# Patient Record
Sex: FEMALE | Race: WHITE | HISPANIC OR LATINO | Employment: UNEMPLOYED | URBAN - METROPOLITAN AREA
[De-identification: names, ages, dates, MRNs, and addresses within clinical notes are randomized per-mention and may not be internally consistent; named-entity substitution may affect disease eponyms.]

---

## 2017-04-04 ENCOUNTER — HOSPITAL ENCOUNTER (OUTPATIENT)
Dept: RADIOLOGY | Facility: MEDICAL CENTER | Age: 57
End: 2017-04-04
Attending: INTERNAL MEDICINE
Payer: COMMERCIAL

## 2017-04-04 DIAGNOSIS — D50.9 IRON DEFICIENCY ANEMIA, UNSPECIFIED: ICD-10-CM

## 2017-04-04 PROCEDURE — 71260 CT THORAX DX C+: CPT

## 2017-04-04 PROCEDURE — 700117 HCHG RX CONTRAST REV CODE 255: Performed by: INTERNAL MEDICINE

## 2017-04-04 RX ADMIN — IOHEXOL 100 ML: 350 INJECTION, SOLUTION INTRAVENOUS at 10:49

## 2017-07-05 ENCOUNTER — HOSPITAL ENCOUNTER (OUTPATIENT)
Dept: RADIOLOGY | Facility: MEDICAL CENTER | Age: 57
End: 2017-07-05
Attending: INTERNAL MEDICINE
Payer: COMMERCIAL

## 2017-07-05 DIAGNOSIS — D50.9 IRON DEFICIENCY ANEMIA, UNSPECIFIED: ICD-10-CM

## 2017-07-05 DIAGNOSIS — C77.2 CANCER OF SIGMOID COLON METASTATIC TO INTRA-ABDOMINAL LYMPH NODE (HCC): ICD-10-CM

## 2017-07-05 DIAGNOSIS — C18.7 CANCER OF SIGMOID COLON METASTATIC TO INTRA-ABDOMINAL LYMPH NODE (HCC): ICD-10-CM

## 2017-07-05 PROCEDURE — 700117 HCHG RX CONTRAST REV CODE 255: Performed by: INTERNAL MEDICINE

## 2017-07-05 PROCEDURE — 71260 CT THORAX DX C+: CPT

## 2017-07-05 RX ADMIN — IOHEXOL 75 ML: 350 INJECTION, SOLUTION INTRAVENOUS at 09:17

## 2018-08-02 ENCOUNTER — APPOINTMENT (OUTPATIENT)
Dept: RADIOLOGY | Facility: MEDICAL CENTER | Age: 58
End: 2018-08-02
Attending: INTERNAL MEDICINE
Payer: COMMERCIAL

## 2018-08-13 ENCOUNTER — HOSPITAL ENCOUNTER (OUTPATIENT)
Dept: RADIOLOGY | Facility: MEDICAL CENTER | Age: 58
End: 2018-08-13
Attending: INTERNAL MEDICINE
Payer: COMMERCIAL

## 2018-08-13 DIAGNOSIS — C18.7 MALIGNANT NEOPLASM OF SIGMOID COLON (HCC): ICD-10-CM

## 2018-08-13 DIAGNOSIS — D50.9 NORMOCYTIC HYPOCHROMIC ANEMIA: ICD-10-CM

## 2018-08-13 PROCEDURE — 700117 HCHG RX CONTRAST REV CODE 255: Performed by: INTERNAL MEDICINE

## 2018-08-13 PROCEDURE — 71260 CT THORAX DX C+: CPT

## 2018-08-13 RX ADMIN — IOHEXOL 100 ML: 350 INJECTION, SOLUTION INTRAVENOUS at 14:00

## 2018-08-13 RX ADMIN — IOHEXOL 50 ML: 240 INJECTION, SOLUTION INTRATHECAL; INTRAVASCULAR; INTRAVENOUS; ORAL at 13:38

## 2018-09-07 ENCOUNTER — HOSPITAL ENCOUNTER (EMERGENCY)
Facility: MEDICAL CENTER | Age: 58
End: 2018-09-07
Attending: EMERGENCY MEDICINE
Payer: COMMERCIAL

## 2018-09-07 ENCOUNTER — APPOINTMENT (OUTPATIENT)
Dept: RADIOLOGY | Facility: MEDICAL CENTER | Age: 58
End: 2018-09-07
Attending: EMERGENCY MEDICINE
Payer: COMMERCIAL

## 2018-09-07 VITALS
RESPIRATION RATE: 18 BRPM | TEMPERATURE: 97.7 F | SYSTOLIC BLOOD PRESSURE: 132 MMHG | HEART RATE: 83 BPM | DIASTOLIC BLOOD PRESSURE: 64 MMHG | OXYGEN SATURATION: 94 % | HEIGHT: 59 IN | WEIGHT: 156.31 LBS | BODY MASS INDEX: 31.51 KG/M2

## 2018-09-07 DIAGNOSIS — R10.13 EPIGASTRIC PAIN: ICD-10-CM

## 2018-09-07 DIAGNOSIS — A09 DIARRHEA OF INFECTIOUS ORIGIN: ICD-10-CM

## 2018-09-07 LAB
ALBUMIN SERPL BCP-MCNC: 4.2 G/DL (ref 3.2–4.9)
ALBUMIN/GLOB SERPL: 1.4 G/DL
ALP SERPL-CCNC: 72 U/L (ref 30–99)
ALT SERPL-CCNC: 33 U/L (ref 2–50)
ANION GAP SERPL CALC-SCNC: 7 MMOL/L (ref 0–11.9)
APPEARANCE UR: CLEAR
AST SERPL-CCNC: 27 U/L (ref 12–45)
BASOPHILS # BLD AUTO: 0.3 % (ref 0–1.8)
BASOPHILS # BLD: 0.02 K/UL (ref 0–0.12)
BILIRUB SERPL-MCNC: 0.4 MG/DL (ref 0.1–1.5)
BILIRUB UR QL STRIP.AUTO: NEGATIVE
BUN SERPL-MCNC: 17 MG/DL (ref 8–22)
CALCIUM SERPL-MCNC: 9.3 MG/DL (ref 8.4–10.2)
CHLORIDE SERPL-SCNC: 107 MMOL/L (ref 96–112)
CO2 SERPL-SCNC: 25 MMOL/L (ref 20–33)
COLOR UR: YELLOW
CREAT SERPL-MCNC: 0.78 MG/DL (ref 0.5–1.4)
EOSINOPHIL # BLD AUTO: 0.19 K/UL (ref 0–0.51)
EOSINOPHIL NFR BLD: 2.4 % (ref 0–6.9)
EPI CELLS #/AREA URNS HPF: ABNORMAL /HPF
ERYTHROCYTE [DISTWIDTH] IN BLOOD BY AUTOMATED COUNT: 40.9 FL (ref 35.9–50)
GLOBULIN SER CALC-MCNC: 3.1 G/DL (ref 1.9–3.5)
GLUCOSE SERPL-MCNC: 105 MG/DL (ref 65–99)
GLUCOSE UR STRIP.AUTO-MCNC: NEGATIVE MG/DL
HCG SERPL QL: NEGATIVE
HCT VFR BLD AUTO: 45 % (ref 37–47)
HGB BLD-MCNC: 15.4 G/DL (ref 12–16)
IMM GRANULOCYTES # BLD AUTO: 0.02 K/UL (ref 0–0.11)
IMM GRANULOCYTES NFR BLD AUTO: 0.3 % (ref 0–0.9)
KETONES UR STRIP.AUTO-MCNC: NEGATIVE MG/DL
LEUKOCYTE ESTERASE UR QL STRIP.AUTO: NEGATIVE
LIPASE SERPL-CCNC: 28 U/L (ref 7–58)
LYMPHOCYTES # BLD AUTO: 3.45 K/UL (ref 1–4.8)
LYMPHOCYTES NFR BLD: 43.7 % (ref 22–41)
MCH RBC QN AUTO: 30 PG (ref 27–33)
MCHC RBC AUTO-ENTMCNC: 34.2 G/DL (ref 33.6–35)
MCV RBC AUTO: 87.7 FL (ref 81.4–97.8)
MICRO URNS: ABNORMAL
MONOCYTES # BLD AUTO: 0.64 K/UL (ref 0–0.85)
MONOCYTES NFR BLD AUTO: 8.1 % (ref 0–13.4)
MUCOUS THREADS #/AREA URNS HPF: ABNORMAL /HPF
NEUTROPHILS # BLD AUTO: 3.58 K/UL (ref 2–7.15)
NEUTROPHILS NFR BLD: 45.2 % (ref 44–72)
NITRITE UR QL STRIP.AUTO: NEGATIVE
NRBC # BLD AUTO: 0 K/UL
NRBC BLD-RTO: 0 /100 WBC
PH UR STRIP.AUTO: 7 [PH]
PLATELET # BLD AUTO: 169 K/UL (ref 164–446)
PMV BLD AUTO: 11.3 FL (ref 9–12.9)
POTASSIUM SERPL-SCNC: 3.3 MMOL/L (ref 3.6–5.5)
PROT SERPL-MCNC: 7.3 G/DL (ref 6–8.2)
PROT UR QL STRIP: NEGATIVE MG/DL
RBC # BLD AUTO: 5.13 M/UL (ref 4.2–5.4)
RBC # URNS HPF: ABNORMAL /HPF
RBC UR QL AUTO: ABNORMAL
SODIUM SERPL-SCNC: 139 MMOL/L (ref 135–145)
SP GR UR STRIP.AUTO: <=1.005
WBC # BLD AUTO: 7.9 K/UL (ref 4.8–10.8)

## 2018-09-07 PROCEDURE — 84703 CHORIONIC GONADOTROPIN ASSAY: CPT

## 2018-09-07 PROCEDURE — 36415 COLL VENOUS BLD VENIPUNCTURE: CPT

## 2018-09-07 PROCEDURE — 99285 EMERGENCY DEPT VISIT HI MDM: CPT

## 2018-09-07 PROCEDURE — 74177 CT ABD & PELVIS W/CONTRAST: CPT

## 2018-09-07 PROCEDURE — A9270 NON-COVERED ITEM OR SERVICE: HCPCS | Performed by: EMERGENCY MEDICINE

## 2018-09-07 PROCEDURE — 700117 HCHG RX CONTRAST REV CODE 255: Performed by: EMERGENCY MEDICINE

## 2018-09-07 PROCEDURE — 80053 COMPREHEN METABOLIC PANEL: CPT

## 2018-09-07 PROCEDURE — 83690 ASSAY OF LIPASE: CPT

## 2018-09-07 PROCEDURE — 85025 COMPLETE CBC W/AUTO DIFF WBC: CPT

## 2018-09-07 PROCEDURE — 96374 THER/PROPH/DIAG INJ IV PUSH: CPT

## 2018-09-07 PROCEDURE — 81001 URINALYSIS AUTO W/SCOPE: CPT

## 2018-09-07 PROCEDURE — 700102 HCHG RX REV CODE 250 W/ 637 OVERRIDE(OP): Performed by: EMERGENCY MEDICINE

## 2018-09-07 PROCEDURE — 700111 HCHG RX REV CODE 636 W/ 250 OVERRIDE (IP): Performed by: EMERGENCY MEDICINE

## 2018-09-07 RX ORDER — ALUMINA, MAGNESIA, AND SIMETHICONE 2400; 2400; 240 MG/30ML; MG/30ML; MG/30ML
10 SUSPENSION ORAL 4 TIMES DAILY PRN
Qty: 560 ML | Refills: 0 | Status: SHIPPED | OUTPATIENT
Start: 2018-09-07 | End: 2020-07-31

## 2018-09-07 RX ORDER — ONDANSETRON 4 MG/1
4 TABLET, ORALLY DISINTEGRATING ORAL EVERY 6 HOURS PRN
Qty: 10 TAB | Refills: 0 | Status: SHIPPED | OUTPATIENT
Start: 2018-09-07 | End: 2020-07-31

## 2018-09-07 RX ORDER — ONDANSETRON 2 MG/ML
4 INJECTION INTRAMUSCULAR; INTRAVENOUS ONCE
Status: COMPLETED | OUTPATIENT
Start: 2018-09-07 | End: 2018-09-07

## 2018-09-07 RX ADMIN — LIDOCAINE HYDROCHLORIDE 30 ML: 20 SOLUTION OROPHARYNGEAL at 17:42

## 2018-09-07 RX ADMIN — ONDANSETRON 4 MG: 2 INJECTION INTRAMUSCULAR; INTRAVENOUS at 17:39

## 2018-09-07 RX ADMIN — IOHEXOL 100 ML: 350 INJECTION, SOLUTION INTRAVENOUS at 17:37

## 2018-09-07 ASSESSMENT — ENCOUNTER SYMPTOMS
NAUSEA: 1
SHORTNESS OF BREATH: 0
HEADACHES: 0
DIZZINESS: 0
BACK PAIN: 0
ABDOMINAL PAIN: 1
CHILLS: 0
DIARRHEA: 1
VOMITING: 1
FEVER: 0

## 2018-09-07 ASSESSMENT — PAIN SCALES - GENERAL: PAINLEVEL_OUTOF10: 4

## 2018-09-07 NOTE — ED NOTES
Presents accompanied by family with complaints of diffuse abdominal pain with intermittent diarrhea since this past Monday.

## 2018-09-08 ENCOUNTER — PATIENT OUTREACH (OUTPATIENT)
Dept: HEALTH INFORMATION MANAGEMENT | Facility: OTHER | Age: 58
End: 2018-09-08

## 2018-09-08 NOTE — ED PROVIDER NOTES
ED Provider Note    Scribed for Aparna Nye M.D. by Aparna Nye. 9/7/2018, 5:13 PM.    Primary care provider: Patricia Rasheed M.D.  Means of arrival: private vehicle  History obtained from: patient  History limited by: none    CHIEF COMPLAINT  Chief Complaint   Patient presents with   • Abdominal Pain   • Diarrhea       HPI  Kalani Gaona is a 58 y.o. female who presents to the Emergency Department for abdominal pain and diarrhea.  Patient reports that for the past 4 days she has had multiple episodes of watery diarrhea a day.  The diarrhea is nonbloody.  She reports associated generalized mild cramping abdominal pain.  Today patient started to have throbbing epigastric pain that is moderate in severity and multiple episodes of nonbloody emesis, therefore she came in for further evaluation.  Patient cannot think of any inciting foods that started her symptoms.  Patient denies any recent travel.  Abdominal history is significant for prior colon cancer with resection in 2015, she had subsequent chemotherapy.  Patient has been in remission for 2 years.  Aside from bowel resection patient has had no other abdominal surgeries.  She denies fevers, chills, dysuria, hematuria.  There are no sick contacts at home.    REVIEW OF SYSTEMS  Review of Systems   Constitutional: Negative for chills and fever.   Respiratory: Negative for shortness of breath.    Cardiovascular: Negative for chest pain.   Gastrointestinal: Positive for abdominal pain, diarrhea, nausea and vomiting.   Genitourinary: Negative for dysuria.   Musculoskeletal: Negative for back pain.   Neurological: Negative for dizziness and headaches.   All other systems reviewed and are negative.    PAST MEDICAL HISTORY   has a past medical history of Arthritis; Cancer (HCC) (6/2015); Hypertension; and Unspecified disorder of thyroid.    SURGICAL HISTORY   has a past surgical history that includes low anterior resection robotic xi (6/18/2015) and  "cath placement (8/18/2015).    SOCIAL HISTORY  Social History   Substance Use Topics   • Smoking status: Never Smoker   • Smokeless tobacco: Never Used   • Alcohol use No      History   Drug Use No       FAMILY HISTORY  History reviewed. No pertinent family history.    CURRENT MEDICATIONS  Home Medications     Reviewed by Adama Dennison (Pharmacy Tech) on 09/07/18 at 1721  Med List Status: Unable to Obtain   Medication Last Dose Status   levothyroxine (SYNTHROID) 125 MCG TABS 9/6/2018 Active   lisinopril-hydrochlorothiazide (PRINZIDE, ZESTORETIC) 20-12.5 MG per tablet 9/6/2018 Active   metFORMIN (GLUCOPHAGE) 500 MG Tab 9/6/2018 Active                ALLERGIES  No Known Allergies    PHYSICAL EXAM  VITAL SIGNS: /85   Pulse 87   Temp 36.5 °C (97.7 °F)   Resp 18   Ht 1.499 m (4' 11\")   Wt 70.9 kg (156 lb 4.9 oz)   SpO2 95%   BMI 31.57 kg/m²   Vitals reviewed by myself.  Physical Exam   Constitutional: She is well-developed, well-nourished, and in no distress. No distress.   HENT:   Head: Normocephalic and atraumatic.   Eyes: EOM are normal.   Neck: Normal range of motion.   Cardiovascular: Normal rate, regular rhythm and normal heart sounds.  Exam reveals no gallop and no friction rub.    No murmur heard.  Pulmonary/Chest: Effort normal and breath sounds normal. No respiratory distress. She has no wheezes. She has no rales.   Abdominal: Soft. Bowel sounds are normal. She exhibits no distension. There is no tenderness. There is no rebound and no guarding.   Musculoskeletal: Normal range of motion.   Neurological: She is alert.   Skin: Skin is warm and dry.         DIAGNOSTIC STUDIES /  LABS  Labs Reviewed   CBC WITH DIFFERENTIAL - Abnormal; Notable for the following:        Result Value    Lymphocytes 43.70 (*)     All other components within normal limits   COMP METABOLIC PANEL - Abnormal; Notable for the following:     Potassium 3.3 (*)     Glucose 105 (*)     All other components within normal " limits   URINALYSIS,CULTURE IF INDICATED - Abnormal; Notable for the following:     Occult Blood Small (*)     All other components within normal limits   URINE MICROSCOPIC (W/UA) - Abnormal; Notable for the following:     RBC 5-10 (*)     All other components within normal limits   LIPASE   HCG QUAL SERUM   ESTIMATED GFR       All labs reviewed by me.      RADIOLOGY  CT-ABDOMEN-PELVIS WITH   Final Result      1.  There is no acute inflammatory process within the abdomen or pelvis.   2.  There is no bowel obstruction.   3.  There is mild hepatic fatty dictation.   4.  There is no significant interval change compared with the recent prior study of 8/13/2018.        The radiologist's interpretation of all radiological studies have been reviewed by me.        COURSE & MEDICAL DECISION MAKING  Nursing notes, VS, PMSFHx reviewed in chart.    Patient is a 58-year-old female who comes in for abdominal pain, nausea, vomiting, diarrhea.  Differential diagnosis includes gastritis, gastroenteritis, dehydration, electrolyte abnormality, urinary tract infection, malignancy, appendicitis.  Diagnostic workup includes labs and CT of the abdomen.    On initial assessment patient is well-appearing with vitals normal limits, except for mildly elevated blood pressure which she is advised to have followed up by her primary care physician.  Her abdominal exam is benign. She is treated with Zofran and GI cocktail.  Labs returned unremarkable except for mild hypokalemia.  CT of the abdomen returns and demonstrates no acute abnormalities.  After treatment patient is feeling improved.  Therefore she is reassured, advised on symptomatic management of diarrheal illness, and given strict return precautions.  Patient is then discharged home in stable condition.    FINAL IMPRESSION  1. Epigastric pain    2. Diarrhea of infectious origin

## 2018-09-08 NOTE — ED NOTES
Unable to obtain med rec at this time, patient doesn't know her medication strength and I was on hold with [facility] for a very long time

## 2018-09-08 NOTE — ED NOTES
Dc instructions and prescriptions reviewed with pt and dtr . To f/u with md, return for worsening s/s

## 2018-09-08 NOTE — DISCHARGE INSTRUCTIONS
Your blood pressure was noted to be mildly elevated today, please continue to recheck your blood pressure at home and follow-up with your primary care physician for this.            Abdominal Pain, Adult  Abdominal pain can be caused by many things. Often, abdominal pain is not serious and it gets better with no treatment or by being treated at home. However, sometimes abdominal pain is serious. Your health care provider will do a medical history and a physical exam to try to determine the cause of your abdominal pain.  Follow these instructions at home:  · Take over-the-counter and prescription medicines only as told by your health care provider. Do not take a laxative unless told by your health care provider.  · Drink enough fluid to keep your urine clear or pale yellow.  · Watch your condition for any changes.  · Keep all follow-up visits as told by your health care provider. This is important.  Contact a health care provider if:  · Your abdominal pain changes or gets worse.  · You are not hungry or you lose weight without trying.  · You are constipated or have diarrhea for more than 2-3 days.  · You have pain when you urinate or have a bowel movement.  · Your abdominal pain wakes you up at night.  · Your pain gets worse with meals, after eating, or with certain foods.  · You are throwing up and cannot keep anything down.  · You have a fever.  Get help right away if:  · Your pain does not go away as soon as your health care provider told you to expect.  · You cannot stop throwing up.  · Your pain is only in areas of the abdomen, such as the right side or the left lower portion of the abdomen.  · You have bloody or black stools, or stools that look like tar.  · You have severe pain, cramping, or bloating in your abdomen.  · You have signs of dehydration, such as:  ¨ Dark urine, very little urine, or no urine.  ¨ Cracked lips.  ¨ Dry mouth.  ¨ Sunken eyes.  ¨ Sleepiness.  ¨ Weakness.  This information is not  intended to replace advice given to you by your health care provider. Make sure you discuss any questions you have with your health care provider.  Document Released: 09/27/2006 Document Revised: 07/07/2017 Document Reviewed: 05/31/2017  Elsevier Interactive Patient Education © 2017 [employer]

## 2020-07-07 ENCOUNTER — HOSPITAL ENCOUNTER (OUTPATIENT)
Dept: LAB | Facility: MEDICAL CENTER | Age: 60
End: 2020-07-07
Attending: OBSTETRICS & GYNECOLOGY
Payer: COMMERCIAL

## 2020-07-07 ENCOUNTER — GYNECOLOGY VISIT (OUTPATIENT)
Dept: OBGYN | Facility: CLINIC | Age: 60
End: 2020-07-07
Payer: COMMERCIAL

## 2020-07-07 VITALS — BODY MASS INDEX: 33.33 KG/M2 | WEIGHT: 165 LBS | SYSTOLIC BLOOD PRESSURE: 161 MMHG | DIASTOLIC BLOOD PRESSURE: 82 MMHG

## 2020-07-07 DIAGNOSIS — Z12.4 CERVICAL CANCER SCREENING: ICD-10-CM

## 2020-07-07 DIAGNOSIS — N95.0 PMB (POSTMENOPAUSAL BLEEDING): ICD-10-CM

## 2020-07-07 LAB — PATHOLOGY CONSULT NOTE: NORMAL

## 2020-07-07 PROCEDURE — 99203 OFFICE O/P NEW LOW 30 MIN: CPT | Mod: 25 | Performed by: OBSTETRICS & GYNECOLOGY

## 2020-07-07 PROCEDURE — 88175 CYTOPATH C/V AUTO FLUID REDO: CPT

## 2020-07-07 PROCEDURE — 88305 TISSUE EXAM BY PATHOLOGIST: CPT

## 2020-07-07 PROCEDURE — 58100 BIOPSY OF UTERUS LINING: CPT | Performed by: OBSTETRICS & GYNECOLOGY

## 2020-07-07 PROCEDURE — 87624 HPV HI-RISK TYP POOLED RSLT: CPT

## 2020-07-07 RX ORDER — LEVOTHYROXINE SODIUM 0.15 MG/1
150 TABLET ORAL
COMMUNITY
Start: 2020-04-18

## 2020-07-07 RX ORDER — LINAGLIPTIN 5 MG/1
5 TABLET, FILM COATED ORAL
COMMUNITY
Start: 2020-04-18 | End: 2020-07-31

## 2020-07-07 RX ORDER — PANTOPRAZOLE SODIUM 40 MG/1
TABLET, DELAYED RELEASE ORAL
COMMUNITY
Start: 2020-04-27 | End: 2020-11-16

## 2020-07-07 RX ORDER — LISINOPRIL 20 MG/1
20 TABLET ORAL
COMMUNITY
Start: 2020-04-18 | End: 2023-11-08

## 2020-07-07 ASSESSMENT — FIBROSIS 4 INDEX: FIB4 SCORE: 1.64

## 2020-07-07 NOTE — PROGRESS NOTES
GYN consult      CC: vaginal bleeding    Performance with audio interpretor [id number]    Referred by PCP: Kassy Porter,  nurse practitioner at [facility]  HPI: Kalani Gaona is a 60 y.o.  with postmenopausal bleeding.  Bleeding on and off for about 1 year.  Last 1-8 days time every month or so, always light/spotting.  Denies heavy bleeding  Reports she went through menopause about 7yrs ago, no VB until about 1 year ago.    Prior to menopause periods were monthly without any issues.    Had pelvic US done a few weeks ago, has not received results from these, the results have not been sent from [facility] with the rest of her referral.    Pt has a personal hx of colon cancer  S/p 12 rounds of chemotherapy after surgery.  Done with treatment, undergoing surveillance.  Had her last colonoscopy less than a year ago, reports that her colonoscopy is every 3 or 4 years at this point  No genetic evaluation that she knows of.  Does have family hx of cancer in several nieces: throat and uterine cancer, also 1 additional cancer but she is not sure what kind.    ROS:  constitutional: denies fevers, general concerns  CV: denies chest pain, palpitations, edema  Resp: Reports no shortness of breath, cough  GI: denies abd pain, diet concerns  :  vaginal bleeding as above, denies discharge, pain, denies urinary complaints  Endo: Reports no significant weight changes, irregular menses, temperature intolerance, denies hotflashes/nightsweats  Psych: reports no concerns about mood, feels well    OBHx:   [id number]  6 vaginal deliveries; 1 baby  approx 1 year of life    GYN Hx:   Menses as above, always regular through adulthood and monthly.  Denies hx of STIs  Denies hx of abnl paps, last pap approx 1 yr   Sexually active with 1 partner.    Past Medical History:   Diagnosis Date   • Arthritis    • Cancer (HCC) 2015    colon   • Hypertension    • Unspecified disorder of thyroid      stopped taking her medication        Past Surgical History:   Procedure Laterality Date   • CATH PLACEMENT  8/18/2015    Procedure: CATH PLACEMENT PORT ;  Surgeon: Curt Seaman M.D.;  Location: SURGERY [facility];  Service:    • LOW ANTERIOR RESECTION ROBOTIC XI  6/18/2015    Procedure: LOW ANTERIOR RESECTION ROBOTIC XI , low pelvic anastomosis , poss open ;  Surgeon: Curt Seaman M.D.;  Location: SURGERY [facility];  Service:    • HERNIA REPAIR         Medications:   Current Outpatient Medications Ordered in Epic   Medication Sig Dispense Refill   • levothyroxine (SYNTHROID) 150 MCG Tab Take 150 mcg by mouth.     • TRADJENTA 5 MG Tab tablet Take 5 mg by mouth.     • lisinopril (PRINIVIL) 20 MG Tab Take 20 mg by mouth.     • metformin (GLUCOPHAGE) 1000 MG tablet Take 1,000 mg by mouth.     • pantoprazole (PROTONIX) 40 MG Tablet Delayed Response TAKE 1 TABLET BY MOUTH TWICE DAILY 30 MINUTES PRIOR TO BREAKFAST AND THEN 30 MINUTES PRIOR TO DINNER     • metFORMIN (GLUCOPHAGE) 500 MG Tab Take 500 mg by mouth 2 times a day, with meals.     • mag hydrox-al hydrox-simeth (MAALOX PLUS ES OR MYLANTA DS) 400-400-40 MG/5ML Suspension Take 10 mL by mouth 4 times a day as needed (abdominal pain). 560 mL 0   • ondansetron (ZOFRAN ODT) 4 MG TABLET DISPERSIBLE Take 1 Tab by mouth every 6 hours as needed for Nausea. 10 Tab 0   • lisinopril-hydrochlorothiazide (PRINZIDE, ZESTORETIC) 20-12.5 MG per tablet Take 1 Tab by mouth every morning.     • levothyroxine (SYNTHROID) 125 MCG TABS Take 125 mcg by mouth Every morning on an empty stomach.       No current [facility]-ordered facility-administered medications on file.        Allergies: Patient has no known allergies.    Social History     Tobacco Use   • Smoking status: Never Smoker   • Smokeless tobacco: Never Used   Substance Use Topics   • Alcohol use: No   • Drug use: No         Family history:  cancer in several nieces: throat and uterine cancer, also 1 additional  "cancer but she is not sure what kind.   Denies other cancers.    Physical Exam:  BP (!) 161/82   Wt 74.8 kg (165 lb)   Breastfeeding No   BMI 33.33 kg/m²   gen: AAO, NAD, affect appropriate  CV: RRR; no LE edema  resp: ctab  abd: soft, NT, ND, no masses,  : NEFG, normal urethral meatus, normal anus/perineum, normal vagina and cervix with ectropion noted.  Uterus 10-12wk sized, anteverted, no adnexal masses/tenderness  Skin: warm/dry, no lesions    A/P: 60 y.o.  w/ postmenopausal bleeding, personal hx of colon ca     1. PMB (postmenopausal bleeding)  Pathology Specimen    Consent for all Surgical, Special Diagnostic or Therapeutic Procedures    THINPREP PAP WITH HPV   2. Cervical cancer screening  Pathology Specimen    THINPREP PAP WITH HPV     Post menopausal bleeding in a patient with personal history of colon cancer.  Patient is unsure if she had genetic evaluation, seemed to have some family history in a niece of uterine cancer.  Pathology report from initial surgical resection does state \"IHC studies for MMRP were performed on prior         biopsy from [id number]. The results         were reported as \"no loss of expression\" for all four mismatch         repair proteins. \"  Which I suspect means she was tested and is negative for Bernal syndrome and her colon cancer.  Oncology records not available.    We discussed the need to evaluate for endometrial cancer or precancerous lesion in patients with persistent postmenopausal bleeding.  Biopsy offered and performed today, see separate procedure note for details.  Patient does report she had a pelvic ultrasound performed recently, by referring provider, we do not have the records from this, we will try to obtain prior to follow-up.    Pap smear updated today.    Return to clinic approximately 2 weeks for discussion of results and plan of care.  Will notify when pathology available prior to that follow-up.  We discussed the risk " of precancerous or cancerous lesion, and if that is found on her pathology patient would like to be informed as soon as possible, including over the phone (pt specifically asked about and is OK with cancer diagnosis reported over the phone), with direct referral to GYN oncology as would be indicated at that point.      Will send a copy of this consultation to [facility] back to referring provider.    Divina Jordan MD  [facility], Women's Health

## 2020-07-07 NOTE — Clinical Note
Please send a copy of my consultation note to PCP at [facility] (who received the referral from)

## 2020-07-07 NOTE — NON-PROVIDER
Pt here for new pt appt  Pt states that she had some PMB for about a year pt states that she has to use a panty liner but mainly when she wipes   Pharmacy verified   Good # [phone]

## 2020-07-08 LAB
AMBIGUOUS DTTM AMBI4: NORMAL
CYTOLOGY REG CYTOL: NORMAL
HPV HR 12 DNA CVX QL NAA+PROBE: NEGATIVE
HPV16 DNA SPEC QL NAA+PROBE: NEGATIVE
HPV18 DNA SPEC QL NAA+PROBE: NEGATIVE
SPECIMEN SOURCE: NORMAL

## 2020-07-08 NOTE — PROCEDURES
Procedures      EMB:  Cervix cleansed with betadine x3.  Ant lip grasped with tenaculum.  Pipelle passed easily through cervix, uterus sounded to 9cm.  4 passes were made with only small collection of tissue.  Specimen labeled and sent to pathology. All instruments removed with hemostasis noted.  Pt tolerated well.    Divina Jordan MD  [facility], Women's Health

## 2020-07-10 ENCOUNTER — DOCUMENTATION (OUTPATIENT)
Dept: OBGYN | Facility: CLINIC | Age: 60
End: 2020-07-10

## 2020-07-10 NOTE — PROGRESS NOTES
TVUS report reviewed in media: endometrium thickened at 2.37cm.    Awaiting in office EMB results.  If not diagnostic will recommend h-scope, D&C.    Divina Jordan MD  [facility], Women's Health

## 2020-07-13 ENCOUNTER — TELEPHONE (OUTPATIENT)
Dept: OBGYN | Facility: CLINIC | Age: 60
End: 2020-07-13

## 2020-07-13 NOTE — TELEPHONE ENCOUNTER
Contacted pt and notified her of Dr. Cruz's message. Pt has follow up appt o 07/28/20    ----- Message from Divina Jordan M.D. sent at 7/13/2020  1:12 PM PDT -----  Please let pt know her biopsy was normal but we may need to look more closely as her ultrasound does show a thicker lining of the uterus.    We will discuss the next step to evaluate her bleeding at her f/u visit this month.

## 2020-07-28 ENCOUNTER — GYNECOLOGY VISIT (OUTPATIENT)
Dept: OBGYN | Facility: CLINIC | Age: 60
End: 2020-07-28
Payer: COMMERCIAL

## 2020-07-28 ENCOUNTER — TELEPHONE (OUTPATIENT)
Dept: OBGYN | Facility: CLINIC | Age: 60
End: 2020-07-28

## 2020-07-28 VITALS — WEIGHT: 164 LBS | DIASTOLIC BLOOD PRESSURE: 89 MMHG | SYSTOLIC BLOOD PRESSURE: 164 MMHG | BODY MASS INDEX: 33.12 KG/M2

## 2020-07-28 DIAGNOSIS — R93.89 THICKENED ENDOMETRIUM: ICD-10-CM

## 2020-07-28 DIAGNOSIS — N95.0 PMB (POSTMENOPAUSAL BLEEDING): ICD-10-CM

## 2020-07-28 PROCEDURE — 99214 OFFICE O/P EST MOD 30 MIN: CPT | Performed by: OBSTETRICS & GYNECOLOGY

## 2020-07-28 RX ORDER — GLIMEPIRIDE 2 MG/1
TABLET ORAL DAILY
COMMUNITY
Start: 2020-07-17 | End: 2020-11-16

## 2020-07-28 ASSESSMENT — FIBROSIS 4 INDEX: FIB4 SCORE: 1.64

## 2020-07-28 NOTE — TELEPHONE ENCOUNTER
Pt called wanting to know if Hysterectomy is an option for her. Adv pt will send provider a message and call pt back with a response. Pt understood and agreed to plan. Message sent to Dr. Julian Jordan M.D.  Arlene Ching, [facility]               Please let her know that we need to evaluate what is in her uterus first.  A lot of patients want to just move to hysterectomy, but if we find cancer she needs to have a hysterectomy with the oncology doctors.  Unfortunately I recommend we do this minor procedure first to really evaluate what is going on then she may need a hysterectomy in the future      7/29/20 1015 Spoke with pt and informed her of above, pt understood and had no questions

## 2020-07-28 NOTE — NON-PROVIDER
Pt here for us results   Pt states that she stopped bleeding she is spotting on and off   Pharmacy verified  Good # [phone]

## 2020-07-28 NOTE — PROGRESS NOTES
GYN Visit      Cc: f/u US, endometrial biopsy for PMB    Visit conducted entirely with in person MA .  HPI: 60 y.o.  here for discussion of results and plan of care.  Patient seen by me earlier this month for postmenopausal bleeding on and off for approximately 1 year.  Patient has a personal history of colon cancer.  At the time of her visit, her prior pelvic ultrasound was not available although this report has been obtained since her last visit.  Endometrial biopsy was performed and was benign as below.    Patient reports doing well today, minimal bleeding.        ROS:  Gen: denies fevers, general concerns  Abd: denies abdominal pain  Gu: small vaginal bleeding, denies discharge, pain    Past Medical History:   Diagnosis Date   • Arthritis    • Cancer (HCC) 2015    colon   • Hypertension    • Unspecified disorder of thyroid     stopped taking her medication      Past Surgical History:   Procedure Laterality Date   • CATH PLACEMENT  2015    Procedure: CATH PLACEMENT PORT ;  Surgeon: Curt Seaman M.D.;  Location: SURGERY [facility];  Service:    • LOW ANTERIOR RESECTION ROBOTIC XI  2015    Procedure: LOW ANTERIOR RESECTION ROBOTIC XI , low pelvic anastomosis , poss open ;  Surgeon: Curt Seaman M.D.;  Location: SURGERY [facility];  Service:    • HERNIA REPAIR       OBHx:   [id number]  6 vaginal deliveries; 1 baby  approx 1 year of life    GYN Hx:   Menses as above, always regular through adulthood and monthly.  Denies hx of STIs  Denies hx of abnl paps, last pap 2020  Sexually active with 1 partner.    Current Outpatient Medications on File Prior to Visit   Medication Sig Dispense Refill   • glimepiride (AMARYL) 2 MG Tab TAKE 1 TABLET BY MOUTH TWICE DAILY FOR 90 DAYS     • levothyroxine (SYNTHROID) 150 MCG Tab Take 150 mcg by mouth.     • TRADJENTA 5 MG Tab tablet Take 5 mg by mouth.     • lisinopril (PRINIVIL) 20 MG Tab Take 20 mg by mouth.     • metformin  (GLUCOPHAGE) 1000 MG tablet Take 1,000 mg by mouth.     • pantoprazole (PROTONIX) 40 MG Tablet Delayed Response TAKE 1 TABLET BY MOUTH TWICE DAILY 30 MINUTES PRIOR TO BREAKFAST AND THEN 30 MINUTES PRIOR TO DINNER     • metFORMIN (GLUCOPHAGE) 500 MG Tab Take 500 mg by mouth 2 times a day, with meals.     • mag hydrox-al hydrox-simeth (MAALOX PLUS ES OR MYLANTA DS) 400-400-40 MG/5ML Suspension Take 10 mL by mouth 4 times a day as needed (abdominal pain). 560 mL 0   • ondansetron (ZOFRAN ODT) 4 MG TABLET DISPERSIBLE Take 1 Tab by mouth every 6 hours as needed for Nausea. 10 Tab 0   • levothyroxine (SYNTHROID) 125 MCG TABS Take 125 mcg by mouth Every morning on an empty stomach.       No current facility-administered medications on file prior to visit.        BP (!) 164/89   Wt 74.4 kg (164 lb)   Breastfeeding No   BMI 33.12 kg/m²   Gen; AAO, NAD      Pelvic ultrasound report in media: Significant for 2.37 cm thickened endometrium    EMB:  A. Endometrial biopsy:          Fragments of benign endometrium with areas of stromal breakdown           and showing changes consistent with disordered proliferative           endometrium          No atypia or malignancy seen       A/P: 60 y.o.  with thickened endometrium, postmenopausal bleeding    Discussed endometrial biopsy normal as above, but given the appearance of endometrium on ultrasound report I am concerned that we did not obtain an adequate specimen.  Recommend further evaluation of endometrium with hysteroscopy, D&C.  Ultrasound performed at [facility], cannot review images personally.  Discussed that the procedure is minor with same-day discharge, typically well-tolerated with low risks of complication.  We did discuss the risks of surgery including pain, bleeding, infection, risk of damage to surrounding vagina, rectum, bladder.  Discussed risk of uterine perforation. Pt confirms she is accepting of blood transfusion in case of emergency.  Reviewed  risks of transfusion including transfusion reaction (fever, damage to heart/lungs/kidneys), risk of exposure to infectious disease including HIV and hepatitis.  All questions answered.  Consent to be signed on day of surgery    Patient hypertensive today, reports she did not take her blood pressure medicine.  Discussed the importance of reasonable blood pressure control in order to proceed with procedure.  Anticipate she will need an EKG in preop.    Will attempt to schedule in the next approximately 2 weeks.  I do not have OR days available during that time, but will try to get in prior to clinic 1 day to expedite her diagnosis.  We discussed that although cancer is not the only possibility in this scenario, that I am currently concerned for possible malignancy inside her uterus despite normal biopsy.  Patient expressed understanding.    Encourage patient to contact us with any questions.  I instructed her that she should hear from us within the next few days as far as surgery date and time.  Discussed we would likely have a morning procedure, with nothing to eat or drink after midnight although does need to take her medications especially her blood pressure medication.  Does not need to return for additional preoperative visit with me.      Patient was seen for 25 minutes of which > 50% of appointment time was spent on face-to-face counseling regarding the above.      Divina Jordan MD  [facility], Women's Health

## 2020-07-31 DIAGNOSIS — Z01.812 PRE-OPERATIVE LABORATORY EXAMINATION: ICD-10-CM

## 2020-07-31 LAB
ANION GAP SERPL CALC-SCNC: 12 MMOL/L (ref 7–16)
BASOPHILS # BLD AUTO: 0.5 % (ref 0–1.8)
BASOPHILS # BLD: 0.04 K/UL (ref 0–0.12)
BUN SERPL-MCNC: 9 MG/DL (ref 8–22)
CALCIUM SERPL-MCNC: 9.4 MG/DL (ref 8.5–10.5)
CHLORIDE SERPL-SCNC: 103 MMOL/L (ref 96–112)
CO2 SERPL-SCNC: 24 MMOL/L (ref 20–33)
CREAT SERPL-MCNC: 0.55 MG/DL (ref 0.5–1.4)
EOSINOPHIL # BLD AUTO: 0.18 K/UL (ref 0–0.51)
EOSINOPHIL NFR BLD: 2.1 % (ref 0–6.9)
ERYTHROCYTE [DISTWIDTH] IN BLOOD BY AUTOMATED COUNT: 41.2 FL (ref 35.9–50)
EST. AVERAGE GLUCOSE BLD GHB EST-MCNC: 180 MG/DL
GLUCOSE SERPL-MCNC: 109 MG/DL (ref 65–99)
HBA1C MFR BLD: 7.9 % (ref 0–5.6)
HCT VFR BLD AUTO: 46.4 % (ref 37–47)
HGB BLD-MCNC: 15.5 G/DL (ref 12–16)
IMM GRANULOCYTES # BLD AUTO: 0.05 K/UL (ref 0–0.11)
IMM GRANULOCYTES NFR BLD AUTO: 0.6 % (ref 0–0.9)
LYMPHOCYTES # BLD AUTO: 3.71 K/UL (ref 1–4.8)
LYMPHOCYTES NFR BLD: 43.5 % (ref 22–41)
MCH RBC QN AUTO: 30.1 PG (ref 27–33)
MCHC RBC AUTO-ENTMCNC: 33.4 G/DL (ref 33.6–35)
MCV RBC AUTO: 90.1 FL (ref 81.4–97.8)
MONOCYTES # BLD AUTO: 0.5 K/UL (ref 0–0.85)
MONOCYTES NFR BLD AUTO: 5.9 % (ref 0–13.4)
NEUTROPHILS # BLD AUTO: 4.04 K/UL (ref 2–7.15)
NEUTROPHILS NFR BLD: 47.4 % (ref 44–72)
NRBC # BLD AUTO: 0 K/UL
NRBC BLD-RTO: 0 /100 WBC
PLATELET # BLD AUTO: 167 K/UL (ref 164–446)
PMV BLD AUTO: 11.8 FL (ref 9–12.9)
POTASSIUM SERPL-SCNC: 4.3 MMOL/L (ref 3.6–5.5)
RBC # BLD AUTO: 5.15 M/UL (ref 4.2–5.4)
SODIUM SERPL-SCNC: 139 MMOL/L (ref 135–145)
WBC # BLD AUTO: 8.5 K/UL (ref 4.8–10.8)

## 2020-07-31 PROCEDURE — 85025 COMPLETE CBC W/AUTO DIFF WBC: CPT

## 2020-07-31 PROCEDURE — 80048 BASIC METABOLIC PNL TOTAL CA: CPT

## 2020-07-31 PROCEDURE — 83036 HEMOGLOBIN GLYCOSYLATED A1C: CPT

## 2020-07-31 PROCEDURE — 36415 COLL VENOUS BLD VENIPUNCTURE: CPT

## 2020-07-31 RX ORDER — ACETAMINOPHEN 500 MG
500-1000 TABLET ORAL EVERY 6 HOURS PRN
COMMUNITY
End: 2023-11-08

## 2020-08-01 ENCOUNTER — OFFICE VISIT (OUTPATIENT)
Dept: ADMISSIONS | Facility: MEDICAL CENTER | Age: 60
End: 2020-08-01
Attending: OBSTETRICS & GYNECOLOGY
Payer: COMMERCIAL

## 2020-08-01 DIAGNOSIS — Z01.812 PRE-OPERATIVE LABORATORY EXAMINATION: ICD-10-CM

## 2020-08-01 LAB — COVID ORDER STATUS COVID19: NORMAL

## 2020-08-01 PROCEDURE — U0003 INFECTIOUS AGENT DETECTION BY NUCLEIC ACID (DNA OR RNA); SEVERE ACUTE RESPIRATORY SYNDROME CORONAVIRUS 2 (SARS-COV-2) (CORONAVIRUS DISEASE [COVID-19]), AMPLIFIED PROBE TECHNIQUE, MAKING USE OF HIGH THROUGHPUT TECHNOLOGIES AS DESCRIBED BY CMS-2020-01-R: HCPCS

## 2020-08-02 LAB
SARS-COV-2 RNA RESP QL NAA+PROBE: NOTDETECTED
SPECIMEN SOURCE: NORMAL

## 2020-08-03 NOTE — OR NURSING
COVID-19 Pre-surgery screenin. Do you have an undiagnosed respiratory illness or symptoms such as coughing or sneezing? nono (Yes/No)  a. Onset of Sx no  b. Acute vs. chronic respiratory illness no    2. Do you have an unexplained fever greater than 100.4 degrees Fahrenheit or 38 degrees Celsius?     no (Yes/No)    3. Have you had direct exposure to a patient who tested positive for Covid-19?    no (Yes/No)    4. Have you had any loss of your sense of taste or smell? Have you had N/V or sore throat? no    Patient has been informed of visitor policy and asked to wear a mask upon entering the hospital   yes (Yes/No)

## 2020-08-04 ENCOUNTER — HOSPITAL ENCOUNTER (OUTPATIENT)
Facility: MEDICAL CENTER | Age: 60
End: 2020-08-04
Attending: OBSTETRICS & GYNECOLOGY | Admitting: OBSTETRICS & GYNECOLOGY
Payer: COMMERCIAL

## 2020-08-04 ENCOUNTER — ANESTHESIA (OUTPATIENT)
Dept: SURGERY | Facility: MEDICAL CENTER | Age: 60
End: 2020-08-04
Payer: COMMERCIAL

## 2020-08-04 ENCOUNTER — ANESTHESIA EVENT (OUTPATIENT)
Dept: SURGERY | Facility: MEDICAL CENTER | Age: 60
End: 2020-08-04
Payer: COMMERCIAL

## 2020-08-04 VITALS
HEIGHT: 59 IN | BODY MASS INDEX: 32.31 KG/M2 | SYSTOLIC BLOOD PRESSURE: 121 MMHG | RESPIRATION RATE: 16 BRPM | HEART RATE: 71 BPM | WEIGHT: 160.27 LBS | OXYGEN SATURATION: 96 % | DIASTOLIC BLOOD PRESSURE: 75 MMHG | TEMPERATURE: 97.6 F

## 2020-08-04 PROBLEM — E66.9 OBESITY (BMI 30-39.9): Status: ACTIVE | Noted: 2020-08-04

## 2020-08-04 LAB
GLUCOSE BLD-MCNC: 122 MG/DL (ref 65–99)
PATHOLOGY CONSULT NOTE: NORMAL

## 2020-08-04 PROCEDURE — 160046 HCHG PACU - 1ST 60 MINS PHASE II: Performed by: OBSTETRICS & GYNECOLOGY

## 2020-08-04 PROCEDURE — 501394 HCHG SOLUTION SORBITOL 3% 3000ML BAG: Performed by: OBSTETRICS & GYNECOLOGY

## 2020-08-04 PROCEDURE — 502587 HCHG PACK, D&C: Performed by: OBSTETRICS & GYNECOLOGY

## 2020-08-04 PROCEDURE — 700101 HCHG RX REV CODE 250: Performed by: OBSTETRICS & GYNECOLOGY

## 2020-08-04 PROCEDURE — 160009 HCHG ANES TIME/MIN: Performed by: OBSTETRICS & GYNECOLOGY

## 2020-08-04 PROCEDURE — 58558 HYSTEROSCOPY BIOPSY: CPT | Performed by: OBSTETRICS & GYNECOLOGY

## 2020-08-04 PROCEDURE — 700111 HCHG RX REV CODE 636 W/ 250 OVERRIDE (IP): Performed by: ANESTHESIOLOGY

## 2020-08-04 PROCEDURE — 700102 HCHG RX REV CODE 250 W/ 637 OVERRIDE(OP): Performed by: ANESTHESIOLOGY

## 2020-08-04 PROCEDURE — 160041 HCHG SURGERY MINUTES - EA ADDL 1 MIN LEVEL 4: Performed by: OBSTETRICS & GYNECOLOGY

## 2020-08-04 PROCEDURE — 160025 RECOVERY II MINUTES (STATS): Performed by: OBSTETRICS & GYNECOLOGY

## 2020-08-04 PROCEDURE — 501838 HCHG SUTURE GENERAL: Performed by: OBSTETRICS & GYNECOLOGY

## 2020-08-04 PROCEDURE — 160048 HCHG OR STATISTICAL LEVEL 1-5: Performed by: OBSTETRICS & GYNECOLOGY

## 2020-08-04 PROCEDURE — 700105 HCHG RX REV CODE 258: Performed by: OBSTETRICS & GYNECOLOGY

## 2020-08-04 PROCEDURE — A9270 NON-COVERED ITEM OR SERVICE: HCPCS | Performed by: ANESTHESIOLOGY

## 2020-08-04 PROCEDURE — 160002 HCHG RECOVERY MINUTES (STAT): Performed by: OBSTETRICS & GYNECOLOGY

## 2020-08-04 PROCEDURE — 700102 HCHG RX REV CODE 250 W/ 637 OVERRIDE(OP): Performed by: OBSTETRICS & GYNECOLOGY

## 2020-08-04 PROCEDURE — 82962 GLUCOSE BLOOD TEST: CPT

## 2020-08-04 PROCEDURE — 160029 HCHG SURGERY MINUTES - 1ST 30 MINS LEVEL 4: Performed by: OBSTETRICS & GYNECOLOGY

## 2020-08-04 PROCEDURE — 88305 TISSUE EXAM BY PATHOLOGIST: CPT

## 2020-08-04 PROCEDURE — A9270 NON-COVERED ITEM OR SERVICE: HCPCS | Performed by: OBSTETRICS & GYNECOLOGY

## 2020-08-04 PROCEDURE — 160035 HCHG PACU - 1ST 60 MINS PHASE I: Performed by: OBSTETRICS & GYNECOLOGY

## 2020-08-04 PROCEDURE — 700101 HCHG RX REV CODE 250: Performed by: ANESTHESIOLOGY

## 2020-08-04 RX ORDER — ONDANSETRON 2 MG/ML
INJECTION INTRAMUSCULAR; INTRAVENOUS PRN
Status: DISCONTINUED | OUTPATIENT
Start: 2020-08-04 | End: 2020-08-04 | Stop reason: SURG

## 2020-08-04 RX ORDER — DEXAMETHASONE SODIUM PHOSPHATE 4 MG/ML
INJECTION, SOLUTION INTRA-ARTICULAR; INTRALESIONAL; INTRAMUSCULAR; INTRAVENOUS; SOFT TISSUE PRN
Status: DISCONTINUED | OUTPATIENT
Start: 2020-08-04 | End: 2020-08-04 | Stop reason: SURG

## 2020-08-04 RX ORDER — SODIUM CHLORIDE, SODIUM LACTATE, POTASSIUM CHLORIDE, CALCIUM CHLORIDE 600; 310; 30; 20 MG/100ML; MG/100ML; MG/100ML; MG/100ML
INJECTION, SOLUTION INTRAVENOUS CONTINUOUS
Status: DISCONTINUED | OUTPATIENT
Start: 2020-08-04 | End: 2020-08-04 | Stop reason: HOSPADM

## 2020-08-04 RX ORDER — ACETAMINOPHEN 500 MG
1000 TABLET ORAL ONCE
Status: COMPLETED | OUTPATIENT
Start: 2020-08-04 | End: 2020-08-04

## 2020-08-04 RX ORDER — BUPIVACAINE HYDROCHLORIDE AND EPINEPHRINE 2.5; 5 MG/ML; UG/ML
INJECTION, SOLUTION EPIDURAL; INFILTRATION; INTRACAUDAL; PERINEURAL
Status: DISCONTINUED
Start: 2020-08-04 | End: 2020-08-04 | Stop reason: HOSPADM

## 2020-08-04 RX ORDER — ROCURONIUM BROMIDE 10 MG/ML
INJECTION, SOLUTION INTRAVENOUS PRN
Status: DISCONTINUED | OUTPATIENT
Start: 2020-08-04 | End: 2020-08-04 | Stop reason: SURG

## 2020-08-04 RX ORDER — HALOPERIDOL 5 MG/ML
1 INJECTION INTRAMUSCULAR
Status: DISCONTINUED | OUTPATIENT
Start: 2020-08-04 | End: 2020-08-04 | Stop reason: HOSPADM

## 2020-08-04 RX ORDER — DIPHENHYDRAMINE HYDROCHLORIDE 50 MG/ML
12.5 INJECTION INTRAMUSCULAR; INTRAVENOUS
Status: DISCONTINUED | OUTPATIENT
Start: 2020-08-04 | End: 2020-08-04 | Stop reason: HOSPADM

## 2020-08-04 RX ORDER — OXYCODONE HCL 5 MG/5 ML
5 SOLUTION, ORAL ORAL
Status: COMPLETED | OUTPATIENT
Start: 2020-08-04 | End: 2020-08-04

## 2020-08-04 RX ORDER — CELECOXIB 200 MG/1
400 CAPSULE ORAL ONCE
Status: COMPLETED | OUTPATIENT
Start: 2020-08-04 | End: 2020-08-04

## 2020-08-04 RX ORDER — LIDOCAINE HYDROCHLORIDE 20 MG/ML
INJECTION, SOLUTION EPIDURAL; INFILTRATION; INTRACAUDAL; PERINEURAL PRN
Status: DISCONTINUED | OUTPATIENT
Start: 2020-08-04 | End: 2020-08-04 | Stop reason: SURG

## 2020-08-04 RX ORDER — BUPIVACAINE HYDROCHLORIDE AND EPINEPHRINE 2.5; 5 MG/ML; UG/ML
INJECTION, SOLUTION EPIDURAL; INFILTRATION; INTRACAUDAL; PERINEURAL
Status: DISCONTINUED | OUTPATIENT
Start: 2020-08-04 | End: 2020-08-04 | Stop reason: HOSPADM

## 2020-08-04 RX ORDER — OXYCODONE HCL 5 MG/5 ML
10 SOLUTION, ORAL ORAL
Status: COMPLETED | OUTPATIENT
Start: 2020-08-04 | End: 2020-08-04

## 2020-08-04 RX ADMIN — ACETAMINOPHEN 1000 MG: 500 TABLET ORAL at 07:15

## 2020-08-04 RX ADMIN — DEXAMETHASONE SODIUM PHOSPHATE 8 MG: 4 INJECTION, SOLUTION INTRA-ARTICULAR; INTRALESIONAL; INTRAMUSCULAR; INTRAVENOUS; SOFT TISSUE at 07:45

## 2020-08-04 RX ADMIN — ROCURONIUM BROMIDE 40 MG: 10 INJECTION, SOLUTION INTRAVENOUS at 07:37

## 2020-08-04 RX ADMIN — ONDANSETRON 4 MG: 2 INJECTION INTRAMUSCULAR; INTRAVENOUS at 07:46

## 2020-08-04 RX ADMIN — CELECOXIB 400 MG: 200 CAPSULE ORAL at 07:15

## 2020-08-04 RX ADMIN — OXYCODONE HYDROCHLORIDE 5 MG: 5 SOLUTION ORAL at 09:06

## 2020-08-04 RX ADMIN — SODIUM CHLORIDE, POTASSIUM CHLORIDE, SODIUM LACTATE AND CALCIUM CHLORIDE 1000 ML: 600; 310; 30; 20 INJECTION, SOLUTION INTRAVENOUS at 06:57

## 2020-08-04 RX ADMIN — SUGAMMADEX 200 MG: 100 INJECTION, SOLUTION INTRAVENOUS at 08:00

## 2020-08-04 RX ADMIN — POVIDONE-IODINE 15 ML: 10 SOLUTION TOPICAL at 06:58

## 2020-08-04 RX ADMIN — LIDOCAINE HYDROCHLORIDE 2 ML: 20 INJECTION, SOLUTION EPIDURAL; INFILTRATION; INTRACAUDAL at 07:37

## 2020-08-04 RX ADMIN — PROPOFOL 150 MG: 10 INJECTION, EMULSION INTRAVENOUS at 07:37

## 2020-08-04 RX ADMIN — FENTANYL CITRATE 100 MCG: 50 INJECTION INTRAMUSCULAR; INTRAVENOUS at 07:36

## 2020-08-04 ASSESSMENT — FIBROSIS 4 INDEX: FIB4 SCORE: 1.66

## 2020-08-04 ASSESSMENT — PAIN SCALES - GENERAL: PAIN_LEVEL: 0

## 2020-08-04 NOTE — DISCHARGE INSTRUCTIONS
Instrucciones Para [place]  (Home Care Instructions)    ACTIVIDAD: Descanse y tome todo con mucha calma las primeras 24 horas después de bryant cirugía.  Gee persona adulta responsable debe permanecer con usted alta dano periodo de tiempo.  Es normal sentirse sonoliento o sonolienta alta esas primeras horas.  Le recomendamos que no iam nada que requiera equilibrio, lacy decisiones a mucha coordinación de bryant parte.    NO IAM ESTO PURANTE LAS PRIMERAS 24 HORAS:   Manejar o conducir algún vehiculo, operar maquinarias o utilizar electrodomesticos.   Beber cerveza o algún otro tipo de bebida alcohólica.   Lacy decisiones importantes o firmar documentos legales.    INSTRUCCIONES ESPECIALES: See HYSTEROSCOPY handout.     DIETA: Para evitar las nauseas, prosiga despacito con bryant dieta a medida que pueda ir tolerándola mejor, evite comidas muy condimentadas o grasosas alta dano primer día.  Vaya agregando comidas más substanciadas a bryant dieta a medida que asi lo indique bryant médica.  Los bebés pueden beber leche preparada o formula, ásl lauryn también leche del seno de la madre a medida que vayan teniendo hambre.  SIGA AGREGANDO LIQUIDOS Y COMIDAS CON FIBRA PARA EVITAR ESTREÑIMIENTO.    LAURYN BAÑARSE Y CAMBIAR LOS VENDAJES DE LA CIRUGIA:     MEDICAMENTOS/MEDICINAS:  Vuelva a lacy aretha medicamentos diarios.  [place] los medicamentos que se le prescribe con un poco de comida.  Si no le prescribe ningún tipo de medicamento, entonces puede lacy medicinas para el dolor que no contienen aspirina, si las necesita.  LAS MEDICINAS PARA EL DOLOR PUEDEN ESTREÑIRLE MUCHO.  [place] un suavizante para el excremento o materia fecal (stool softener) o un laxativo lauryn por ejemplo: senokot, pericolase, o leche de magnesia, si lo necesita.    La prescripción la administro .  La ultima sosis de medicina para el dolor fue administrada .     Se debe hacer gee consulta medica con el doctor, Líame para hacer la bimal.    Usted debe LIAMAR A BRYANT MEDICO si  tiene los siguientes síntomas:   -   Hailey fiebre más kalyn de 101 grados Fahrenheit.   -   Un dolor incesante aún con los medicamentos, o nauseas y vómito persistente.   -   Un sangrado excesivo (karen que traspasa los vendajes o gasas) o algúln tipo de drenaje inesperado que proviene de la henda.     -   Un color rodriges exagerado o hinchazón alrededor del área en donde se le hizo incisión o neal, o un drenaje de pus o con olor lisandra proveniente de la henda.   -    La inhabilidad de orinar o vaciar bryant vejiga en 8 horas.   -    Problemas con a respiración o cinthya en el pecho.    Usted debe llamar al 911 si se presentan problemas con el dolor al respirar o el pecho.  Si no se puede ponnoer en comunicación con un medica o con el centro de cirugía, usted debe ir a la estación de emergencia (emergency room) más cercana o a un centro de atención de urgencia (urgent care center).  El teléfono del medico es:  Dr. Jordan [phone]    LOS SÍNTOMAS DE UN LEVE RESFRIO SON MUY NORMALES.  ADEMÁS USTED PUEDE LLEGAR A SENTIR CINTHYA GENERALES DE MÚSCULOS, IRRITACIÓN EN LA GARGANTA, CINTHYA DE KLEVER Y/O UN POCO DE NAUSEAS.    Sie tiene alguna pregunta, llame a bryant médico.  Si bryant médico no se encuentra disponible, por favor llame al Centro de Cirugía at [phone].  el Centro está abierto de Lunes a Viernes desde las 7:00 de la manana hasta las 7:00 de la noche.  Usted también puede llamar al [facility] o HEALTH HOTLINE.  Mera está abierto viente y cuatro horas por aaron, siete carrasco por semana, allí podrá hablar con hailey enfermera.  Llame al [phone], o al lesleyo therese 2 [phone].    Mi firma a continuación indica que he recibido y entiendco estas instrucciones acera de los cuidados en la casa (Home Care Instructions)    Usted recibirá hailey encuesta en la correspondencia en las siguientes semanas y le pedimos que por favor tome un momento para completar tripp encuesta y regresaría a hosotros.   Nuestro objetivó es brindarle un cuidado muy macias y par lo tanto apreciamos aretha coméntanos.  Muchas yessi por darius escogido el [facility].

## 2020-08-04 NOTE — OP REPORT
Operative Report  20      PreOp Diagnosis:   1. Postmenopausal bleeding  2. Thickened endometrium  3. History of colon cancer  4. HTN  5. hypothyroidism    PostOp Diagnosis:   1. Postmenopausal bleeding  2. Thickened endometrium  3. History of colon cancer  4. HTN  5. hypothyroidism    Procedure(s):  HYSTEROSCOPY, DIAGNOSTIC - Wound Class: Clean Contaminated  DILATION AND CURETTAGE - Wound Class: Clean Contaminated    Surgeon(s):  Divina Jordan M.D.    Anesthesiologist/Type of Anesthesia:  Anesthesiologist: Dileep Martinez M.D./General    Surgical Staff:  Circulator: Chiquis Santos R.N.  Scrub Person: Corinna Dewitt    Indication: 61yo  w/ personal history of colon cancer had PMB which was benign.  Secondary to 2.37cm thickened endometrium on ultrasound additional evaluation was recommended and she is brought to the operating room for hysterosocpy, D&C.    Specimens removed if any:  ID Type Source Tests Collected by Time Destination   A : ENDOMETRIAL CURETTINGS Other Other PATHOLOGY SPECIMEN Divina Jordan M.D. 2020  7:56 AM        Estimated Blood Loss: 10cc  IVF: 500cc LR  UOP: 100cc  Fluid deficit: approx 100cc    Findings: uterus retroverted, approx 8wk size.  Endometrium proliferative in appearance, slightly irregular but no polyp or discrete mass visualized    Complications: none      Procedure in Detail:  Pt was taken to the operating room where general anesthesia was initiated after SCDs were placed.  She was prepped and draped in the normal, sterile fashion using Chas stirrups.  The bladder was drained with straight catheter producing approximately 100cc of clear urine.  A speculum was inserted into the vagina and the anterior lip of the cervix grasped with single tooth tenaculum.    A paracervical block was placed with 10cc of 0.25% bupivicaine w/ dilute epinephrine.  The cervix was easily dilated to 8mm.  The hysteroscope was prepped and advanced into the uterine  cavity with the above findings noted.  The hysteroscope was removed, a sharp curette advanced into the endometrial cavity and the endometrium curetted until a gritty texture was appreciated circumferentially.  There was not a copious amount of tissue.  The tissue was collected, labeled, and sent to pathology.  There was small bleeding noted from the left tenaculum site for which silver nitrate and pressure were applied.  All instruments were removed from the vagina with hemostasis noted.  The pt was awakened from GETA and taken to the operating room in stable condition.  All counts were correct.  There were no complications.       Divina Jordan MD  [facility], Women's Health

## 2020-08-04 NOTE — ANESTHESIA POSTPROCEDURE EVALUATION
Patient: Kalani Gaona    Procedure Summary     Date:  08/04/20 Room / Location:  [facility] ROOM 25 / SURGERY SAME DAY [facility]    Anesthesia Start:  0730 Anesthesia Stop:  0818    Procedures:       HYSTEROSCOPY, DIAGNOSTIC (N/A Vagina )      DILATION AND CURETTAGE (N/A Vagina ) Diagnosis:  (POSTMENOPAUSAL BLEEDING)    Surgeon:  Divina Jordan M.D. Responsible Provider:  Dileep Martinez M.D.    Anesthesia Type:  general ASA Status:  2          Final Anesthesia Type: general  Last vitals  BP   Blood Pressure: 128/71    Temp   36.6 °C (97.8 °F)    Pulse   Pulse: 78   Resp   18    SpO2   97 %      Anesthesia Post Evaluation    Patient location during evaluation: PACU  Patient participation: complete - patient participated  Level of consciousness: awake and alert  Pain score: 0    Airway patency: patent  Anesthetic complications: no  Cardiovascular status: hemodynamically stable  Respiratory status: acceptable  Hydration status: euvolemic    PONV: none

## 2020-08-04 NOTE — ANESTHESIA TIME REPORT
Anesthesia Start and Stop Event Times     Date Time Event    8/4/2020 0719 Ready for Procedure     0730 Anesthesia Start     0818 Anesthesia Stop        Responsible Staff  08/04/20    Name Role Begin End    Dileep Martinez M.D. Anesth 0730 0818        Preop Diagnosis (Free Text):  Pre-op Diagnosis     POSTMENOPAUSAL BLEEDING        Preop Diagnosis (Codes):    Post op Diagnosis  Post-menopausal bleeding      Premium Reason  Non-Premium    Comments:

## 2020-08-04 NOTE — OR NURSING
0815 Pt in PACU from OR, report from Dr. Martinez and OR RN. Pt denies pain/nausea. VSS.     1005 DC instructions reviewed with pt and her daughter who verbalize understanding. Pt dressed and ready to DC home.     1007 Pt discharged to car via wheelchair.

## 2020-08-04 NOTE — ANESTHESIA PREPROCEDURE EVALUATION
Relevant Problems   No relevant active problems     Obesity, diabetes  Physical Exam    Airway   Mallampati: II  TM distance: >3 FB  Neck ROM: full       Cardiovascular - normal exam  Rhythm: regular  Rate: normal  (-) murmur     Dental - normal exam           Pulmonary - normal exam  Breath sounds clear to auscultation     Abdominal   (+) obese     Neurological - normal exam                 Anesthesia Plan    ASA 2       Plan - general       Airway plan will be ETT        Induction: intravenous    Postoperative Plan: Postoperative administration of opioids is intended.    Pertinent diagnostic labs and testing reviewed    Informed Consent:    Anesthetic plan and risks discussed with patient.    Use of blood products discussed with: patient whom consented to blood products.

## 2020-08-04 NOTE — ANESTHESIA PROCEDURE NOTES
Airway    Date/Time: 8/4/2020 7:39 AM  Performed by: Dileep Martinez M.D.  Authorized by: Dileep Martinez M.D.     Location:  OR  Urgency:  Elective  Indications for Airway Management:  Anesthesia      Spontaneous Ventilation: absent    Sedation Level:  Deep  Preoxygenated: Yes    Patient Position:  Sniffing  Mask Difficulty Assessment:  1 - vent by mask  Final Airway Type:  Endotracheal airway  Final Endotracheal Airway:  ETT  Cuffed: Yes    Technique Used for Successful ETT Placement:  Direct laryngoscopy    Insertion Site:  Oral  Blade Type:  Taryn  Laryngoscope Blade/Videolaryngoscope Blade Size:  3  ETT Size (mm):  6.5  Measured from:  Teeth  ETT to Teeth (cm):  22  Placement Verified by: auscultation and capnometry    Cormack-Lehane Classification:  Grade IIa - partial view of glottis  Number of Attempts at Approach:  1  Number of Other Approaches Attempted:  0

## 2020-08-26 ENCOUNTER — TELEPHONE (OUTPATIENT)
Dept: OBGYN | Facility: CLINIC | Age: 60
End: 2020-08-26

## 2020-08-26 ENCOUNTER — GYNECOLOGY VISIT (OUTPATIENT)
Dept: OBGYN | Facility: CLINIC | Age: 60
End: 2020-08-26
Payer: COMMERCIAL

## 2020-08-26 VITALS — WEIGHT: 159 LBS | DIASTOLIC BLOOD PRESSURE: 75 MMHG | BODY MASS INDEX: 32.11 KG/M2 | SYSTOLIC BLOOD PRESSURE: 131 MMHG

## 2020-08-26 DIAGNOSIS — N95.0 PMB (POSTMENOPAUSAL BLEEDING): ICD-10-CM

## 2020-08-26 PROCEDURE — 99024 POSTOP FOLLOW-UP VISIT: CPT | Performed by: OBSTETRICS & GYNECOLOGY

## 2020-08-26 RX ORDER — ATORVASTATIN CALCIUM 10 MG/1
10 TABLET, FILM COATED ORAL
Status: ON HOLD | COMMUNITY
Start: 2020-08-17 | End: 2021-05-28

## 2020-08-26 ASSESSMENT — FIBROSIS 4 INDEX: FIB4 SCORE: 1.66

## 2020-08-26 NOTE — TELEPHONE ENCOUNTER
----- Message from Divina Jordan M.D. sent at 8/5/2020 10:17 AM PDT -----  Normal pathology    MA: pt Romansh speaking only; please let pt know her pathology from the surgery is benign, no signs of cancer, so we need to see if her bleeding continues moving forward.  We can talk more at her upcoming visit    8/26/20 1445 Spoke with pt and informed her of above. Pt understood and had no questions or concerns

## 2020-08-26 NOTE — PROGRESS NOTES
GYN Visit:    CC: postop check    Visit conducted with iPad  [id number]  HPI: 60 y.o.yo  s/p h-scope, D&C on 2020 with me for PMB, thickened EMS.  Here today for f/u and to discuss plan of care.    Pt reports doing well.  No major concerns but does endorse that she still having some mild vaginal bleeding.  Reports she is using no more than 1 pad a day, very light at this time.      ROS:   Gen: denies fevers, general concerns  Abd: denies abd pain, GI concerns  : denies discharge    /75   Wt 72.1 kg (159 lb)   Breastfeeding No   BMI 32.11 kg/m²   Gen: AAO, NAD    FINAL DIAGNOSIS:     A. Endometrial curettings:          Multiple fragments of benign endometrial glandular epithelium           and stromal tissue showing predominantly detached tubular           glands and strips of endometrial glandular epithelium with           surrounding blood clot. There are a few small fragments showing           disordered proliferative endometrium with surrounding           endometrial stromal tissue. Focal benign squamous ectocervical           epithelium and endocervical glandular epithelium are also           noted.          No dysplasia, hyperplasia or malignancy is seen.          See comment.         A/P:60 y.o.yo  s/p hscope, D&C on  with me for PMH, thickened endometrium  - pathology benign, reviewed as above; entire cavity was visualized intraoperatively.  - no signs of postop complications    Patient given prescription for repeat ultrasound, to be done at [facility] so I can review the imaging.  Advised if she is still having bleeding over the next 2 weeks to schedule this after that time and then to return to discuss results and plan of care with me.  Discussed that if she is still having vaginal bleeding with a completely negative work-up sometimes we need to consider hysterectomy to ensure we are not missing any kind of precancerous changes.  If she is still having bleeding and her  ultrasound is abnormal, I would consider sending her straight to GYN oncology for additional evaluation given her personal history of colon cancer.  If her ultrasound is normal, and she is continuing to have bleeding, could consider a total vaginal hysterectomy with our office.    F/u: 1 month after US.    Divina Jordan MD  [facility], Women's Health

## 2020-09-16 ENCOUNTER — HOSPITAL ENCOUNTER (OUTPATIENT)
Dept: RADIOLOGY | Facility: MEDICAL CENTER | Age: 60
End: 2020-09-16
Attending: OBSTETRICS & GYNECOLOGY
Payer: COMMERCIAL

## 2020-09-16 DIAGNOSIS — N95.0 PMB (POSTMENOPAUSAL BLEEDING): ICD-10-CM

## 2020-09-16 PROCEDURE — 76830 TRANSVAGINAL US NON-OB: CPT

## 2020-09-24 DIAGNOSIS — R93.5 ABNORMAL ULTRASOUND OF ENDOMETRIUM: ICD-10-CM

## 2020-09-24 DIAGNOSIS — N95.0 PMB (POSTMENOPAUSAL BLEEDING): ICD-10-CM

## 2020-09-29 ENCOUNTER — TELEPHONE (OUTPATIENT)
Dept: OBGYN | Facility: CLINIC | Age: 60
End: 2020-09-29

## 2020-09-29 NOTE — TELEPHONE ENCOUNTER
Per Dr. Cruz to call pt and explain she hernadez snot need to come to today's appt d/t been refer to Dr. Vital for f/u and explain that no cancer was identified but the lining of her uterus is still thick and the rist of cancer still there but its low.     Pt called and explained as above and Dr. Vital's phone # and address provided to pt and advised to call tomorrow to schedule appt. Pt agreed and verbalized understanding.    Dr. Cruz was present for the entire phone call.

## 2020-11-06 ENCOUNTER — HOSPITAL ENCOUNTER (OUTPATIENT)
Facility: MEDICAL CENTER | Age: 60
End: 2020-11-06
Attending: OBSTETRICS & GYNECOLOGY | Admitting: OBSTETRICS & GYNECOLOGY
Payer: COMMERCIAL

## 2020-11-06 DIAGNOSIS — Z01.812 PRE-OPERATIVE LABORATORY EXAMINATION: ICD-10-CM

## 2020-11-16 ENCOUNTER — HOSPITAL ENCOUNTER (OUTPATIENT)
Dept: RADIOLOGY | Facility: MEDICAL CENTER | Age: 60
End: 2020-11-16
Attending: SPECIALIST | Admitting: OBSTETRICS & GYNECOLOGY
Payer: COMMERCIAL

## 2020-11-16 ENCOUNTER — PRE-ADMISSION TESTING (OUTPATIENT)
Dept: ADMISSIONS | Facility: MEDICAL CENTER | Age: 60
End: 2020-11-16
Attending: OBSTETRICS & GYNECOLOGY
Payer: COMMERCIAL

## 2020-11-16 VITALS — WEIGHT: 159.61 LBS | HEIGHT: 59 IN | BODY MASS INDEX: 32.18 KG/M2

## 2020-11-16 DIAGNOSIS — Z01.812 PRE-OPERATIVE LABORATORY EXAMINATION: ICD-10-CM

## 2020-11-16 DIAGNOSIS — Z01.811 PRE-OPERATIVE RESPIRATORY EXAMINATION: ICD-10-CM

## 2020-11-16 DIAGNOSIS — Z01.810 PRE-OPERATIVE CARDIOVASCULAR EXAMINATION: ICD-10-CM

## 2020-11-16 LAB
ABO GROUP BLD: NORMAL
ALBUMIN SERPL BCP-MCNC: 4.2 G/DL (ref 3.2–4.9)
ALBUMIN/GLOB SERPL: 1.3 G/DL
ALP SERPL-CCNC: 86 U/L (ref 30–99)
ALT SERPL-CCNC: 61 U/L (ref 2–50)
ANION GAP SERPL CALC-SCNC: 9 MMOL/L (ref 7–16)
APTT PPP: 32.2 SEC (ref 24.7–36)
AST SERPL-CCNC: 50 U/L (ref 12–45)
BASOPHILS # BLD AUTO: 0.6 % (ref 0–1.8)
BASOPHILS # BLD: 0.04 K/UL (ref 0–0.12)
BILIRUB SERPL-MCNC: 0.5 MG/DL (ref 0.1–1.5)
BLD GP AB SCN SERPL QL: NORMAL
BUN SERPL-MCNC: 10 MG/DL (ref 8–22)
CALCIUM SERPL-MCNC: 10 MG/DL (ref 8.5–10.5)
CANCER AG125 SERPL-ACNC: 31.6 U/ML (ref 0–35)
CHLORIDE SERPL-SCNC: 103 MMOL/L (ref 96–112)
CO2 SERPL-SCNC: 31 MMOL/L (ref 20–33)
COVID ORDER STATUS COVID19: NORMAL
CREAT SERPL-MCNC: 0.48 MG/DL (ref 0.5–1.4)
EKG IMPRESSION: NORMAL
EOSINOPHIL # BLD AUTO: 0.1 K/UL (ref 0–0.51)
EOSINOPHIL NFR BLD: 1.4 % (ref 0–6.9)
ERYTHROCYTE [DISTWIDTH] IN BLOOD BY AUTOMATED COUNT: 40.3 FL (ref 35.9–50)
EST. AVERAGE GLUCOSE BLD GHB EST-MCNC: 177 MG/DL
GLOBULIN SER CALC-MCNC: 3.3 G/DL (ref 1.9–3.5)
GLUCOSE SERPL-MCNC: 147 MG/DL (ref 65–99)
HBA1C MFR BLD: 7.8 % (ref 0–5.6)
HCT VFR BLD AUTO: 46 % (ref 37–47)
HGB BLD-MCNC: 15.1 G/DL (ref 12–16)
IMM GRANULOCYTES # BLD AUTO: 0.03 K/UL (ref 0–0.11)
IMM GRANULOCYTES NFR BLD AUTO: 0.4 % (ref 0–0.9)
INR PPP: 1 (ref 0.87–1.13)
LYMPHOCYTES # BLD AUTO: 2.91 K/UL (ref 1–4.8)
LYMPHOCYTES NFR BLD: 41.9 % (ref 22–41)
MCH RBC QN AUTO: 29.7 PG (ref 27–33)
MCHC RBC AUTO-ENTMCNC: 32.8 G/DL (ref 33.6–35)
MCV RBC AUTO: 90.4 FL (ref 81.4–97.8)
MONOCYTES # BLD AUTO: 0.35 K/UL (ref 0–0.85)
MONOCYTES NFR BLD AUTO: 5 % (ref 0–13.4)
NEUTROPHILS # BLD AUTO: 3.52 K/UL (ref 2–7.15)
NEUTROPHILS NFR BLD: 50.7 % (ref 44–72)
NRBC # BLD AUTO: 0 K/UL
NRBC BLD-RTO: 0 /100 WBC
PLATELET # BLD AUTO: 264 K/UL (ref 164–446)
PMV BLD AUTO: 11 FL (ref 9–12.9)
POTASSIUM SERPL-SCNC: 4.1 MMOL/L (ref 3.6–5.5)
PROT SERPL-MCNC: 7.5 G/DL (ref 6–8.2)
PROTHROMBIN TIME: 13.5 SEC (ref 12–14.6)
RBC # BLD AUTO: 5.09 M/UL (ref 4.2–5.4)
RH BLD: NORMAL
SARS-COV-2 RNA RESP QL NAA+PROBE: DETECTED
SODIUM SERPL-SCNC: 143 MMOL/L (ref 135–145)
SPECIMEN SOURCE: ABNORMAL
WBC # BLD AUTO: 7 K/UL (ref 4.8–10.8)

## 2020-11-16 PROCEDURE — 85025 COMPLETE CBC W/AUTO DIFF WBC: CPT

## 2020-11-16 PROCEDURE — 36415 COLL VENOUS BLD VENIPUNCTURE: CPT

## 2020-11-16 PROCEDURE — 93005 ELECTROCARDIOGRAM TRACING: CPT

## 2020-11-16 PROCEDURE — 86901 BLOOD TYPING SEROLOGIC RH(D): CPT

## 2020-11-16 PROCEDURE — 83036 HEMOGLOBIN GLYCOSYLATED A1C: CPT

## 2020-11-16 PROCEDURE — 85730 THROMBOPLASTIN TIME PARTIAL: CPT

## 2020-11-16 PROCEDURE — 71045 X-RAY EXAM CHEST 1 VIEW: CPT

## 2020-11-16 PROCEDURE — 86900 BLOOD TYPING SEROLOGIC ABO: CPT

## 2020-11-16 PROCEDURE — 86304 IMMUNOASSAY TUMOR CA 125: CPT

## 2020-11-16 PROCEDURE — 93010 ELECTROCARDIOGRAM REPORT: CPT | Performed by: INTERNAL MEDICINE

## 2020-11-16 PROCEDURE — U0003 INFECTIOUS AGENT DETECTION BY NUCLEIC ACID (DNA OR RNA); SEVERE ACUTE RESPIRATORY SYNDROME CORONAVIRUS 2 (SARS-COV-2) (CORONAVIRUS DISEASE [COVID-19]), AMPLIFIED PROBE TECHNIQUE, MAKING USE OF HIGH THROUGHPUT TECHNOLOGIES AS DESCRIBED BY CMS-2020-01-R: HCPCS

## 2020-11-16 PROCEDURE — 80053 COMPREHEN METABOLIC PANEL: CPT

## 2020-11-16 PROCEDURE — 85610 PROTHROMBIN TIME: CPT

## 2020-11-16 PROCEDURE — 86850 RBC ANTIBODY SCREEN: CPT

## 2020-12-08 ENCOUNTER — HOSPITAL ENCOUNTER (OUTPATIENT)
Facility: MEDICAL CENTER | Age: 60
End: 2020-12-08
Attending: OBSTETRICS & GYNECOLOGY | Admitting: OBSTETRICS & GYNECOLOGY
Payer: COMMERCIAL

## 2021-01-04 ENCOUNTER — PRE-ADMISSION TESTING (OUTPATIENT)
Dept: ADMISSIONS | Facility: MEDICAL CENTER | Age: 61
End: 2021-01-04
Attending: OBSTETRICS & GYNECOLOGY
Payer: COMMERCIAL

## 2021-01-04 VITALS — HEIGHT: 59 IN | BODY MASS INDEX: 32.53 KG/M2 | WEIGHT: 161.38 LBS

## 2021-01-04 DIAGNOSIS — Z01.812 PRE-OPERATIVE LABORATORY EXAMINATION: ICD-10-CM

## 2021-01-04 LAB
ABO GROUP BLD: NORMAL
ALBUMIN SERPL BCP-MCNC: 4.5 G/DL (ref 3.2–4.9)
ALBUMIN/GLOB SERPL: 1.3 G/DL
ALP SERPL-CCNC: 90 U/L (ref 30–99)
ALT SERPL-CCNC: 62 U/L (ref 2–50)
ANION GAP SERPL CALC-SCNC: 11 MMOL/L (ref 7–16)
APTT PPP: 33.1 SEC (ref 24.7–36)
AST SERPL-CCNC: 34 U/L (ref 12–45)
BASOPHILS # BLD AUTO: 0.4 % (ref 0–1.8)
BASOPHILS # BLD: 0.04 K/UL (ref 0–0.12)
BILIRUB SERPL-MCNC: 0.6 MG/DL (ref 0.1–1.5)
BLD GP AB SCN SERPL QL: NORMAL
BUN SERPL-MCNC: 8 MG/DL (ref 8–22)
CALCIUM SERPL-MCNC: 10.9 MG/DL (ref 8.5–10.5)
CHLORIDE SERPL-SCNC: 100 MMOL/L (ref 96–112)
CO2 SERPL-SCNC: 28 MMOL/L (ref 20–33)
CREAT SERPL-MCNC: 0.52 MG/DL (ref 0.5–1.4)
EOSINOPHIL # BLD AUTO: 0.13 K/UL (ref 0–0.51)
EOSINOPHIL NFR BLD: 1.4 % (ref 0–6.9)
ERYTHROCYTE [DISTWIDTH] IN BLOOD BY AUTOMATED COUNT: 43 FL (ref 35.9–50)
EST. AVERAGE GLUCOSE BLD GHB EST-MCNC: 166 MG/DL
GLOBULIN SER CALC-MCNC: 3.4 G/DL (ref 1.9–3.5)
GLUCOSE SERPL-MCNC: 124 MG/DL (ref 65–99)
HBA1C MFR BLD: 7.4 % (ref 0–5.6)
HCT VFR BLD AUTO: 50.3 % (ref 37–47)
HGB BLD-MCNC: 16.2 G/DL (ref 12–16)
IMM GRANULOCYTES # BLD AUTO: 0.03 K/UL (ref 0–0.11)
IMM GRANULOCYTES NFR BLD AUTO: 0.3 % (ref 0–0.9)
INR PPP: 0.99 (ref 0.87–1.13)
LYMPHOCYTES # BLD AUTO: 4.09 K/UL (ref 1–4.8)
LYMPHOCYTES NFR BLD: 45.4 % (ref 22–41)
MCH RBC QN AUTO: 29.2 PG (ref 27–33)
MCHC RBC AUTO-ENTMCNC: 32.2 G/DL (ref 33.6–35)
MCV RBC AUTO: 90.6 FL (ref 81.4–97.8)
MONOCYTES # BLD AUTO: 0.47 K/UL (ref 0–0.85)
MONOCYTES NFR BLD AUTO: 5.2 % (ref 0–13.4)
NEUTROPHILS # BLD AUTO: 4.24 K/UL (ref 2–7.15)
NEUTROPHILS NFR BLD: 47.3 % (ref 44–72)
NRBC # BLD AUTO: 0 K/UL
NRBC BLD-RTO: 0 /100 WBC
PLATELET # BLD AUTO: 191 K/UL (ref 164–446)
PMV BLD AUTO: 12.5 FL (ref 9–12.9)
POTASSIUM SERPL-SCNC: 5 MMOL/L (ref 3.6–5.5)
PROT SERPL-MCNC: 7.9 G/DL (ref 6–8.2)
PROTHROMBIN TIME: 13.4 SEC (ref 12–14.6)
RBC # BLD AUTO: 5.55 M/UL (ref 4.2–5.4)
RH BLD: NORMAL
SODIUM SERPL-SCNC: 139 MMOL/L (ref 135–145)
WBC # BLD AUTO: 9 K/UL (ref 4.8–10.8)

## 2021-01-04 PROCEDURE — 85730 THROMBOPLASTIN TIME PARTIAL: CPT

## 2021-01-04 PROCEDURE — 85025 COMPLETE CBC W/AUTO DIFF WBC: CPT

## 2021-01-04 PROCEDURE — 86901 BLOOD TYPING SEROLOGIC RH(D): CPT

## 2021-01-04 PROCEDURE — 86900 BLOOD TYPING SEROLOGIC ABO: CPT

## 2021-01-04 PROCEDURE — 80053 COMPREHEN METABOLIC PANEL: CPT

## 2021-01-04 PROCEDURE — 36415 COLL VENOUS BLD VENIPUNCTURE: CPT

## 2021-01-04 PROCEDURE — 86850 RBC ANTIBODY SCREEN: CPT

## 2021-01-04 PROCEDURE — 83036 HEMOGLOBIN GLYCOSYLATED A1C: CPT

## 2021-01-04 PROCEDURE — 85610 PROTHROMBIN TIME: CPT

## 2021-01-04 RX ORDER — GLIMEPIRIDE 2 MG/1
2 TABLET ORAL
Status: ON HOLD | COMMUNITY
End: 2021-05-28

## 2021-01-04 RX ORDER — CHOLECALCIFEROL (VITAMIN D3) 125 MCG
1 CAPSULE ORAL
COMMUNITY

## 2021-01-04 ASSESSMENT — FIBROSIS 4 INDEX: FIB4 SCORE: 1.43

## 2021-05-11 ENCOUNTER — PRE-ADMISSION TESTING (OUTPATIENT)
Dept: ADMISSIONS | Facility: MEDICAL CENTER | Age: 61
End: 2021-05-11
Attending: OBSTETRICS & GYNECOLOGY
Payer: COMMERCIAL

## 2021-05-11 DIAGNOSIS — Z01.812 PRE-OPERATIVE LABORATORY EXAMINATION: ICD-10-CM

## 2021-05-11 DIAGNOSIS — Z01.810 PRE-OPERATIVE CARDIOVASCULAR EXAMINATION: ICD-10-CM

## 2021-05-11 LAB
ABO GROUP BLD: NORMAL
ALBUMIN SERPL BCP-MCNC: 4.4 G/DL (ref 3.2–4.9)
ALBUMIN/GLOB SERPL: 1.4 G/DL
ALP SERPL-CCNC: 95 U/L (ref 30–99)
ALT SERPL-CCNC: 69 U/L (ref 2–50)
ANION GAP SERPL CALC-SCNC: 11 MMOL/L (ref 7–16)
APTT PPP: 31 SEC (ref 24.7–36)
AST SERPL-CCNC: 37 U/L (ref 12–45)
BASOPHILS # BLD AUTO: 0.5 % (ref 0–1.8)
BASOPHILS # BLD: 0.05 K/UL (ref 0–0.12)
BILIRUB SERPL-MCNC: 0.3 MG/DL (ref 0.1–1.5)
BLD GP AB SCN SERPL QL: NORMAL
BUN SERPL-MCNC: 11 MG/DL (ref 8–22)
CALCIUM SERPL-MCNC: 10.2 MG/DL (ref 8.5–10.5)
CHLORIDE SERPL-SCNC: 103 MMOL/L (ref 96–112)
CO2 SERPL-SCNC: 26 MMOL/L (ref 20–33)
CREAT SERPL-MCNC: 0.59 MG/DL (ref 0.5–1.4)
EKG IMPRESSION: NORMAL
EOSINOPHIL # BLD AUTO: 0.12 K/UL (ref 0–0.51)
EOSINOPHIL NFR BLD: 1.3 % (ref 0–6.9)
ERYTHROCYTE [DISTWIDTH] IN BLOOD BY AUTOMATED COUNT: 43.5 FL (ref 35.9–50)
EST. AVERAGE GLUCOSE BLD GHB EST-MCNC: 160 MG/DL
GLOBULIN SER CALC-MCNC: 3.2 G/DL (ref 1.9–3.5)
GLUCOSE SERPL-MCNC: 139 MG/DL (ref 65–99)
HBA1C MFR BLD: 7.2 % (ref 4–5.6)
HCT VFR BLD AUTO: 47.9 % (ref 37–47)
HGB BLD-MCNC: 15.5 G/DL (ref 12–16)
IMM GRANULOCYTES # BLD AUTO: 0.02 K/UL (ref 0–0.11)
IMM GRANULOCYTES NFR BLD AUTO: 0.2 % (ref 0–0.9)
INR PPP: 0.97 (ref 0.87–1.13)
LYMPHOCYTES # BLD AUTO: 4.07 K/UL (ref 1–4.8)
LYMPHOCYTES NFR BLD: 44.1 % (ref 22–41)
MCH RBC QN AUTO: 29.8 PG (ref 27–33)
MCHC RBC AUTO-ENTMCNC: 32.4 G/DL (ref 33.6–35)
MCV RBC AUTO: 92.1 FL (ref 81.4–97.8)
MONOCYTES # BLD AUTO: 0.51 K/UL (ref 0–0.85)
MONOCYTES NFR BLD AUTO: 5.5 % (ref 0–13.4)
NEUTROPHILS # BLD AUTO: 4.46 K/UL (ref 2–7.15)
NEUTROPHILS NFR BLD: 48.4 % (ref 44–72)
NRBC # BLD AUTO: 0 K/UL
NRBC BLD-RTO: 0 /100 WBC
PLATELET # BLD AUTO: 187 K/UL (ref 164–446)
PMV BLD AUTO: 11.7 FL (ref 9–12.9)
POTASSIUM SERPL-SCNC: 4.5 MMOL/L (ref 3.6–5.5)
PROT SERPL-MCNC: 7.6 G/DL (ref 6–8.2)
PROTHROMBIN TIME: 13.2 SEC (ref 12–14.6)
RBC # BLD AUTO: 5.2 M/UL (ref 4.2–5.4)
RH BLD: NORMAL
SODIUM SERPL-SCNC: 140 MMOL/L (ref 135–145)
WBC # BLD AUTO: 9.2 K/UL (ref 4.8–10.8)

## 2021-05-11 PROCEDURE — 86900 BLOOD TYPING SEROLOGIC ABO: CPT

## 2021-05-11 PROCEDURE — 80053 COMPREHEN METABOLIC PANEL: CPT

## 2021-05-11 PROCEDURE — 93010 ELECTROCARDIOGRAM REPORT: CPT | Performed by: INTERNAL MEDICINE

## 2021-05-11 PROCEDURE — 86901 BLOOD TYPING SEROLOGIC RH(D): CPT

## 2021-05-11 PROCEDURE — 85025 COMPLETE CBC W/AUTO DIFF WBC: CPT

## 2021-05-11 PROCEDURE — 83036 HEMOGLOBIN GLYCOSYLATED A1C: CPT

## 2021-05-11 PROCEDURE — 85610 PROTHROMBIN TIME: CPT

## 2021-05-11 PROCEDURE — 93005 ELECTROCARDIOGRAM TRACING: CPT

## 2021-05-11 PROCEDURE — 85730 THROMBOPLASTIN TIME PARTIAL: CPT

## 2021-05-11 PROCEDURE — 36415 COLL VENOUS BLD VENIPUNCTURE: CPT

## 2021-05-11 PROCEDURE — 86850 RBC ANTIBODY SCREEN: CPT

## 2021-05-11 RX ORDER — DULAGLUTIDE 0.75 MG/.5ML
0.5 INJECTION, SOLUTION SUBCUTANEOUS
COMMUNITY

## 2021-05-11 ASSESSMENT — FIBROSIS 4 INDEX: FIB4 SCORE: 1.36

## 2021-05-18 ENCOUNTER — APPOINTMENT (OUTPATIENT)
Dept: ADMISSIONS | Facility: MEDICAL CENTER | Age: 61
End: 2021-05-18
Payer: COMMERCIAL

## 2021-05-25 ENCOUNTER — PRE-ADMISSION TESTING (OUTPATIENT)
Dept: ADMISSIONS | Facility: MEDICAL CENTER | Age: 61
End: 2021-05-25
Attending: OBSTETRICS & GYNECOLOGY
Payer: COMMERCIAL

## 2021-05-25 DIAGNOSIS — Z01.812 PRE-OPERATIVE LABORATORY EXAMINATION: ICD-10-CM

## 2021-05-25 LAB
SARS-COV-2 RNA RESP QL NAA+PROBE: NOTDETECTED
SPECIMEN SOURCE: NORMAL

## 2021-05-25 PROCEDURE — C9803 HOPD COVID-19 SPEC COLLECT: HCPCS

## 2021-05-25 PROCEDURE — U0005 INFEC AGEN DETEC AMPLI PROBE: HCPCS

## 2021-05-25 PROCEDURE — U0003 INFECTIOUS AGENT DETECTION BY NUCLEIC ACID (DNA OR RNA); SEVERE ACUTE RESPIRATORY SYNDROME CORONAVIRUS 2 (SARS-COV-2) (CORONAVIRUS DISEASE [COVID-19]), AMPLIFIED PROBE TECHNIQUE, MAKING USE OF HIGH THROUGHPUT TECHNOLOGIES AS DESCRIBED BY CMS-2020-01-R: HCPCS

## 2021-05-28 ENCOUNTER — ANESTHESIA EVENT (OUTPATIENT)
Dept: SURGERY | Facility: MEDICAL CENTER | Age: 61
End: 2021-05-28
Payer: COMMERCIAL

## 2021-05-28 ENCOUNTER — ANESTHESIA (OUTPATIENT)
Dept: SURGERY | Facility: MEDICAL CENTER | Age: 61
End: 2021-05-28
Payer: COMMERCIAL

## 2021-05-28 ENCOUNTER — HOSPITAL ENCOUNTER (OUTPATIENT)
Facility: MEDICAL CENTER | Age: 61
End: 2021-05-28
Attending: OBSTETRICS & GYNECOLOGY | Admitting: OBSTETRICS & GYNECOLOGY
Payer: COMMERCIAL

## 2021-05-28 VITALS
DIASTOLIC BLOOD PRESSURE: 65 MMHG | SYSTOLIC BLOOD PRESSURE: 115 MMHG | HEIGHT: 59 IN | RESPIRATION RATE: 12 BRPM | WEIGHT: 158.29 LBS | TEMPERATURE: 98 F | OXYGEN SATURATION: 96 % | BODY MASS INDEX: 31.91 KG/M2 | HEART RATE: 91 BPM

## 2021-05-28 LAB
ABO + RH BLD: NORMAL
GLUCOSE BLD-MCNC: 159 MG/DL (ref 65–99)
GLUCOSE BLD-MCNC: 209 MG/DL (ref 65–99)
PATHOLOGY CONSULT NOTE: NORMAL

## 2021-05-28 PROCEDURE — 88307 TISSUE EXAM BY PATHOLOGIST: CPT | Mod: 59

## 2021-05-28 PROCEDURE — A9270 NON-COVERED ITEM OR SERVICE: HCPCS | Performed by: ANESTHESIOLOGY

## 2021-05-28 PROCEDURE — 700111 HCHG RX REV CODE 636 W/ 250 OVERRIDE (IP): Performed by: ANESTHESIOLOGY

## 2021-05-28 PROCEDURE — 501399 HCHG SPECIMAN BAG, ENDO CATC: Performed by: OBSTETRICS & GYNECOLOGY

## 2021-05-28 PROCEDURE — 700105 HCHG RX REV CODE 258: Performed by: OBSTETRICS & GYNECOLOGY

## 2021-05-28 PROCEDURE — 160036 HCHG PACU - EA ADDL 30 MINS PHASE I: Performed by: OBSTETRICS & GYNECOLOGY

## 2021-05-28 PROCEDURE — 502779 HCHG SUTURE, QUILL: Performed by: OBSTETRICS & GYNECOLOGY

## 2021-05-28 PROCEDURE — 160025 RECOVERY II MINUTES (STATS): Performed by: OBSTETRICS & GYNECOLOGY

## 2021-05-28 PROCEDURE — 82962 GLUCOSE BLOOD TEST: CPT

## 2021-05-28 PROCEDURE — 160042 HCHG SURGERY MINUTES - EA ADDL 1 MIN LEVEL 5: Performed by: OBSTETRICS & GYNECOLOGY

## 2021-05-28 PROCEDURE — 160031 HCHG SURGERY MINUTES - 1ST 30 MINS LEVEL 5: Performed by: OBSTETRICS & GYNECOLOGY

## 2021-05-28 PROCEDURE — A9270 NON-COVERED ITEM OR SERVICE: HCPCS | Performed by: OBSTETRICS & GYNECOLOGY

## 2021-05-28 PROCEDURE — 88331 PATH CONSLTJ SURG 1 BLK 1SPC: CPT

## 2021-05-28 PROCEDURE — 700101 HCHG RX REV CODE 250: Performed by: ANESTHESIOLOGY

## 2021-05-28 PROCEDURE — 500513 HCHG ENDO RETRACTOR: Performed by: OBSTETRICS & GYNECOLOGY

## 2021-05-28 PROCEDURE — 700102 HCHG RX REV CODE 250 W/ 637 OVERRIDE(OP): Performed by: ANESTHESIOLOGY

## 2021-05-28 PROCEDURE — 502714 HCHG ROBOTIC SURGERY SERVICES: Performed by: OBSTETRICS & GYNECOLOGY

## 2021-05-28 PROCEDURE — 501582 HCHG TROCAR, THRD BLADED: Performed by: OBSTETRICS & GYNECOLOGY

## 2021-05-28 PROCEDURE — 160046 HCHG PACU - 1ST 60 MINS PHASE II: Performed by: OBSTETRICS & GYNECOLOGY

## 2021-05-28 PROCEDURE — 700101 HCHG RX REV CODE 250: Performed by: OBSTETRICS & GYNECOLOGY

## 2021-05-28 PROCEDURE — 160047 HCHG PACU  - EA ADDL 30 MINS PHASE II: Performed by: OBSTETRICS & GYNECOLOGY

## 2021-05-28 PROCEDURE — 160002 HCHG RECOVERY MINUTES (STAT): Performed by: OBSTETRICS & GYNECOLOGY

## 2021-05-28 PROCEDURE — 500854 HCHG NEEDLE, INSUFFLATION FOR STEP: Performed by: OBSTETRICS & GYNECOLOGY

## 2021-05-28 PROCEDURE — 160035 HCHG PACU - 1ST 60 MINS PHASE I: Performed by: OBSTETRICS & GYNECOLOGY

## 2021-05-28 PROCEDURE — 160048 HCHG OR STATISTICAL LEVEL 1-5: Performed by: OBSTETRICS & GYNECOLOGY

## 2021-05-28 PROCEDURE — 501838 HCHG SUTURE GENERAL: Performed by: OBSTETRICS & GYNECOLOGY

## 2021-05-28 PROCEDURE — 160009 HCHG ANES TIME/MIN: Performed by: OBSTETRICS & GYNECOLOGY

## 2021-05-28 RX ORDER — ONDANSETRON 2 MG/ML
4 INJECTION INTRAMUSCULAR; INTRAVENOUS
Status: COMPLETED | OUTPATIENT
Start: 2021-05-28 | End: 2021-05-28

## 2021-05-28 RX ORDER — HYDRALAZINE HYDROCHLORIDE 20 MG/ML
5 INJECTION INTRAMUSCULAR; INTRAVENOUS
Status: DISCONTINUED | OUTPATIENT
Start: 2021-05-28 | End: 2021-05-28 | Stop reason: HOSPADM

## 2021-05-28 RX ORDER — HYDROMORPHONE HYDROCHLORIDE 1 MG/ML
0.2 INJECTION, SOLUTION INTRAMUSCULAR; INTRAVENOUS; SUBCUTANEOUS
Status: DISCONTINUED | OUTPATIENT
Start: 2021-05-28 | End: 2021-05-28 | Stop reason: HOSPADM

## 2021-05-28 RX ORDER — DIPHENHYDRAMINE HYDROCHLORIDE 50 MG/ML
12.5 INJECTION INTRAMUSCULAR; INTRAVENOUS
Status: DISCONTINUED | OUTPATIENT
Start: 2021-05-28 | End: 2021-05-28 | Stop reason: HOSPADM

## 2021-05-28 RX ORDER — LIDOCAINE HYDROCHLORIDE 20 MG/ML
INJECTION, SOLUTION EPIDURAL; INFILTRATION; INTRACAUDAL; PERINEURAL PRN
Status: DISCONTINUED | OUTPATIENT
Start: 2021-05-28 | End: 2021-05-28 | Stop reason: SURG

## 2021-05-28 RX ORDER — LABETALOL HYDROCHLORIDE 5 MG/ML
5 INJECTION, SOLUTION INTRAVENOUS
Status: DISCONTINUED | OUTPATIENT
Start: 2021-05-28 | End: 2021-05-28 | Stop reason: HOSPADM

## 2021-05-28 RX ORDER — KETOROLAC TROMETHAMINE 30 MG/ML
INJECTION, SOLUTION INTRAMUSCULAR; INTRAVENOUS PRN
Status: DISCONTINUED | OUTPATIENT
Start: 2021-05-28 | End: 2021-05-28 | Stop reason: SURG

## 2021-05-28 RX ORDER — HYDROMORPHONE HYDROCHLORIDE 1 MG/ML
0.4 INJECTION, SOLUTION INTRAMUSCULAR; INTRAVENOUS; SUBCUTANEOUS
Status: DISCONTINUED | OUTPATIENT
Start: 2021-05-28 | End: 2021-05-28 | Stop reason: HOSPADM

## 2021-05-28 RX ORDER — ONDANSETRON 2 MG/ML
INJECTION INTRAMUSCULAR; INTRAVENOUS PRN
Status: DISCONTINUED | OUTPATIENT
Start: 2021-05-28 | End: 2021-05-28 | Stop reason: SURG

## 2021-05-28 RX ORDER — HALOPERIDOL 5 MG/ML
1 INJECTION INTRAMUSCULAR
Status: DISCONTINUED | OUTPATIENT
Start: 2021-05-28 | End: 2021-05-28 | Stop reason: HOSPADM

## 2021-05-28 RX ORDER — ASPIRIN 81 MG/1
81 TABLET, COATED ORAL EVERY EVENING
COMMUNITY
Start: 2021-04-05 | End: 2023-11-08

## 2021-05-28 RX ORDER — SODIUM CHLORIDE, SODIUM LACTATE, POTASSIUM CHLORIDE, CALCIUM CHLORIDE 600; 310; 30; 20 MG/100ML; MG/100ML; MG/100ML; MG/100ML
INJECTION, SOLUTION INTRAVENOUS CONTINUOUS
Status: ACTIVE | OUTPATIENT
Start: 2021-05-28 | End: 2021-05-28

## 2021-05-28 RX ORDER — DEXAMETHASONE SODIUM PHOSPHATE 4 MG/ML
INJECTION, SOLUTION INTRA-ARTICULAR; INTRALESIONAL; INTRAMUSCULAR; INTRAVENOUS; SOFT TISSUE PRN
Status: DISCONTINUED | OUTPATIENT
Start: 2021-05-28 | End: 2021-05-28 | Stop reason: SURG

## 2021-05-28 RX ORDER — ACETAMINOPHEN 500 MG
1000 TABLET ORAL ONCE
Status: COMPLETED | OUTPATIENT
Start: 2021-05-28 | End: 2021-05-28

## 2021-05-28 RX ORDER — CEFAZOLIN SODIUM 1 G/3ML
INJECTION, POWDER, FOR SOLUTION INTRAMUSCULAR; INTRAVENOUS PRN
Status: DISCONTINUED | OUTPATIENT
Start: 2021-05-28 | End: 2021-05-28 | Stop reason: SURG

## 2021-05-28 RX ORDER — BUPIVACAINE HYDROCHLORIDE AND EPINEPHRINE 2.5; 5 MG/ML; UG/ML
INJECTION, SOLUTION EPIDURAL; INFILTRATION; INTRACAUDAL; PERINEURAL
Status: DISCONTINUED | OUTPATIENT
Start: 2021-05-28 | End: 2021-05-28 | Stop reason: HOSPADM

## 2021-05-28 RX ORDER — DEXTROSE MONOHYDRATE 25 G/50ML
50 INJECTION, SOLUTION INTRAVENOUS
Status: DISCONTINUED | OUTPATIENT
Start: 2021-05-28 | End: 2021-05-28 | Stop reason: HOSPADM

## 2021-05-28 RX ORDER — SODIUM CHLORIDE, SODIUM LACTATE, POTASSIUM CHLORIDE, CALCIUM CHLORIDE 600; 310; 30; 20 MG/100ML; MG/100ML; MG/100ML; MG/100ML
INJECTION, SOLUTION INTRAVENOUS CONTINUOUS
Status: DISCONTINUED | OUTPATIENT
Start: 2021-05-28 | End: 2021-05-28 | Stop reason: HOSPADM

## 2021-05-28 RX ORDER — HYDROMORPHONE HYDROCHLORIDE 1 MG/ML
0.1 INJECTION, SOLUTION INTRAMUSCULAR; INTRAVENOUS; SUBCUTANEOUS
Status: DISCONTINUED | OUTPATIENT
Start: 2021-05-28 | End: 2021-05-28 | Stop reason: HOSPADM

## 2021-05-28 RX ORDER — ATORVASTATIN CALCIUM 20 MG/1
20 TABLET, FILM COATED ORAL
COMMUNITY
Start: 2021-05-08 | End: 2023-11-15

## 2021-05-28 RX ORDER — OXYCODONE HCL 5 MG/5 ML
10 SOLUTION, ORAL ORAL
Status: COMPLETED | OUTPATIENT
Start: 2021-05-28 | End: 2021-05-28

## 2021-05-28 RX ORDER — SODIUM CHLORIDE, SODIUM LACTATE, POTASSIUM CHLORIDE, CALCIUM CHLORIDE 600; 310; 30; 20 MG/100ML; MG/100ML; MG/100ML; MG/100ML
INJECTION, SOLUTION INTRAVENOUS
Status: COMPLETED | OUTPATIENT
Start: 2021-05-28 | End: 2021-05-28

## 2021-05-28 RX ORDER — ROCURONIUM BROMIDE 10 MG/ML
INJECTION, SOLUTION INTRAVENOUS PRN
Status: DISCONTINUED | OUTPATIENT
Start: 2021-05-28 | End: 2021-05-28 | Stop reason: SURG

## 2021-05-28 RX ORDER — OXYCODONE HCL 5 MG/5 ML
5 SOLUTION, ORAL ORAL
Status: COMPLETED | OUTPATIENT
Start: 2021-05-28 | End: 2021-05-28

## 2021-05-28 RX ADMIN — SODIUM CHLORIDE, POTASSIUM CHLORIDE, SODIUM LACTATE AND CALCIUM CHLORIDE: 600; 310; 30; 20 INJECTION, SOLUTION INTRAVENOUS at 10:26

## 2021-05-28 RX ADMIN — SODIUM CHLORIDE, POTASSIUM CHLORIDE, SODIUM LACTATE AND CALCIUM CHLORIDE: 600; 310; 30; 20 INJECTION, SOLUTION INTRAVENOUS at 11:46

## 2021-05-28 RX ADMIN — FENTANYL CITRATE 50 MCG: 50 INJECTION, SOLUTION INTRAMUSCULAR; INTRAVENOUS at 11:51

## 2021-05-28 RX ADMIN — LIDOCAINE HYDROCHLORIDE 50 MG: 20 INJECTION, SOLUTION EPIDURAL; INFILTRATION; INTRACAUDAL at 12:05

## 2021-05-28 RX ADMIN — KETOROLAC TROMETHAMINE 30 MG: 30 INJECTION, SOLUTION INTRAMUSCULAR at 13:22

## 2021-05-28 RX ADMIN — FENTANYL CITRATE 25 MCG: 50 INJECTION, SOLUTION INTRAMUSCULAR; INTRAVENOUS at 14:11

## 2021-05-28 RX ADMIN — HYDROMORPHONE HYDROCHLORIDE 0.2 MG: 1 INJECTION, SOLUTION INTRAMUSCULAR; INTRAVENOUS; SUBCUTANEOUS at 14:33

## 2021-05-28 RX ADMIN — ACETAMINOPHEN 1000 MG: 500 TABLET ORAL at 10:26

## 2021-05-28 RX ADMIN — FENTANYL CITRATE 50 MCG: 50 INJECTION, SOLUTION INTRAMUSCULAR; INTRAVENOUS at 12:16

## 2021-05-28 RX ADMIN — ONDANSETRON 4 MG: 2 INJECTION INTRAMUSCULAR; INTRAVENOUS at 13:22

## 2021-05-28 RX ADMIN — LIDOCAINE HYDROCHLORIDE 80 MG: 20 INJECTION, SOLUTION EPIDURAL; INFILTRATION; INTRACAUDAL at 11:51

## 2021-05-28 RX ADMIN — POVIDONE IODINE 15 ML: 100 SOLUTION TOPICAL at 10:26

## 2021-05-28 RX ADMIN — FENTANYL CITRATE 25 MCG: 50 INJECTION, SOLUTION INTRAMUSCULAR; INTRAVENOUS at 14:09

## 2021-05-28 RX ADMIN — ONDANSETRON 4 MG: 2 INJECTION INTRAMUSCULAR; INTRAVENOUS at 16:18

## 2021-05-28 RX ADMIN — DEXAMETHASONE SODIUM PHOSPHATE 4 MG: 4 INJECTION, SOLUTION INTRA-ARTICULAR; INTRALESIONAL; INTRAMUSCULAR; INTRAVENOUS; SOFT TISSUE at 12:45

## 2021-05-28 RX ADMIN — HYDROMORPHONE HYDROCHLORIDE 0.2 MG: 1 INJECTION, SOLUTION INTRAMUSCULAR; INTRAVENOUS; SUBCUTANEOUS at 14:41

## 2021-05-28 RX ADMIN — FENTANYL CITRATE 50 MCG: 50 INJECTION, SOLUTION INTRAMUSCULAR; INTRAVENOUS at 12:49

## 2021-05-28 RX ADMIN — HYDROMORPHONE HYDROCHLORIDE 0.2 MG: 1 INJECTION, SOLUTION INTRAMUSCULAR; INTRAVENOUS; SUBCUTANEOUS at 14:50

## 2021-05-28 RX ADMIN — FENTANYL CITRATE 100 MCG: 50 INJECTION, SOLUTION INTRAMUSCULAR; INTRAVENOUS at 13:18

## 2021-05-28 RX ADMIN — ROCURONIUM BROMIDE 50 MG: 10 INJECTION, SOLUTION INTRAVENOUS at 11:51

## 2021-05-28 RX ADMIN — OXYCODONE HYDROCHLORIDE 10 MG: 5 SOLUTION ORAL at 14:06

## 2021-05-28 RX ADMIN — PROPOFOL 160 MG: 10 INJECTION, EMULSION INTRAVENOUS at 11:51

## 2021-05-28 RX ADMIN — CEFAZOLIN 2 G: 330 INJECTION, POWDER, FOR SOLUTION INTRAMUSCULAR; INTRAVENOUS at 11:51

## 2021-05-28 ASSESSMENT — PAIN DESCRIPTION - PAIN TYPE
TYPE: SURGICAL PAIN

## 2021-05-28 ASSESSMENT — PAIN SCALES - GENERAL: PAIN_LEVEL: 2

## 2021-05-28 ASSESSMENT — FIBROSIS 4 INDEX: FIB4 SCORE: 1.43

## 2021-05-28 NOTE — ANESTHESIA PREPROCEDURE EVALUATION
Uterine CA, HTN, IDDM2, GERD.     Relevant Problems   No relevant active problems       Physical Exam    Airway   Mallampati: II  TM distance: >3 FB  Neck ROM: full       Cardiovascular - normal exam  Rhythm: regular  Rate: normal  (-) murmur     Dental - normal exam           Pulmonary - normal exam  Breath sounds clear to auscultation     Abdominal    Neurological - normal exam                 Anesthesia Plan    ASA 3   ASA physical status 3 criteria: diabetes - poorly controlled    Plan - general       Airway plan will be ETT          Induction: intravenous    Postoperative Plan: Postoperative administration of opioids is intended.    Pertinent diagnostic labs and testing reviewed    Informed Consent:    Anesthetic plan and risks discussed with patient.    Use of blood products discussed with: patient whom consented to blood products.

## 2021-05-28 NOTE — OR NURSING
Patient to Phase II in stable condition. VSS. No changes in surgical dressings or sites. Aox4 and on RA. No further needs.

## 2021-05-28 NOTE — ANESTHESIA PROCEDURE NOTES
Airway    Date/Time: 5/28/2021 11:53 AM  Performed by: José Luis Mckenna M.D.  Authorized by: José Luis Mckenna M.D.     Location:  OR  Urgency:  Elective  Indications for Airway Management:  Anesthesia      Spontaneous Ventilation: absent    Sedation Level:  Deep  Preoxygenated: Yes    Patient Position:  Sniffing  Mask Difficulty Assessment:  0 - not attempted  Final Airway Type:  Endotracheal airway  Final Endotracheal Airway:  ETT  Cuffed: Yes    Technique Used for Successful ETT Placement:  Direct laryngoscopy    Insertion Site:  Oral  Blade Type:  Taryn  Laryngoscope Blade/Videolaryngoscope Blade Size:  3  ETT Size (mm):  7.0  Measured from:  Lips  ETT to Lips (cm):  21  Placement Verified by: auscultation and capnometry    Cormack-Lehane Classification:  Grade I - full view of glottis  Number of Attempts at Approach:  1  Number of Other Approaches Attempted:  0

## 2021-05-28 NOTE — PROGRESS NOTES
Med rec complete per pt at bedside with  IPAD.  Interviewed pt with family at bedside with permission from pt  Allergies reviewed and updated.

## 2021-05-28 NOTE — ANESTHESIA TIME REPORT
Anesthesia Start and Stop Event Times     Date Time Event    5/28/2021 1130 Ready for Procedure     1146 Anesthesia Start     1349 Anesthesia Stop        Responsible Staff  05/28/21    Name Role Begin End    José Luis Mckenna M.D. Anesth 1146 1349        Preop Diagnosis (Free Text):  Pre-op Diagnosis     BLEEDING        Preop Diagnosis (Codes):    Post op Diagnosis  Endometrial cancer (HCC)      Premium Reason  Non-Premium    Comments:

## 2021-05-28 NOTE — OP REPORT
Operative Report    Date: 2021    Surgeon: Kenna Rodriges M.D.     Assistant: Maninder Vital M.D.    Anesthesiologist: José Luis Mckenna M.D.    Pre-operative Diagnosis:   Postmenopausal bleeding   Thickened endometrial stripe   Obesity   History of colon cancer   Benign endometrial sampling     Post-operative Diagnosis:   Postmenopausal bleeding   Thickened endometrial stripe   Obesity   History of colon cancer   Benign endometrial sampling     Procedure: Robotic total laparoscopic hysterectomy with bilateral salpingo-oophorectomy    Indications: Mrs. Todd Gaona is a pleasant 60 year old  female whose LMP was in . She has a past medical history significant for  diabetes, HTN, hypothyroidism, GERD, arthritis, and obesity (BMI 33), colon cancer in . She has a past surgical history significant for  a robotic low anterior resection in  and umbilical hernia repair with mesh in . She denies having undergone any  genetic testing, and reports having undergone chemotherapy but no radiation for her colon cancer. She has been [facility] in [facility] past. Patient reports that her last colonoscopy was normal in 2020. She was in her usual state of health until  20 when she presented to her gynecologist, for complaint of intermittent post menopausal bleeding. She subsequently underwent a  transvaginal ultrasound which revealed a thickened endometrial stripe of 2.37 cm. On 2020,  the patient underwent an office endometrial biopsy which revealed benign, disordered proliferative endometrium. Given that she had  continued bleeding, she was taken to the operating room for hysteroscopy D&C on 2020. Final pathology was benign, with results  revealing multiple fragments of benign endometrial glandular epithelium and stromal tissue showing predominantly detached tubular glands  and strips of endometrial glandular epithelium with surrounding blood clot, few small fragments  showing disordered proliferative  endometrium with surrounding endometrial stromal tissue. Focal benign squamous ectocervical epithelium and endocervical glandular  epithelium were also noted. She was seen for followup on 8/26/2020, at which time she noted continued light vaginal bleeding. A pelvic  ultrasound was performed on 9/16/20 at [facility] showing uterus 4.2 x 9.3 x 6.3 cm, EEC poorly defined measuring up to 1.2 cm, right  ovary not seen, left ovary 3.7 x 3.6 cm, no free fluid. Given this history and with concern for occult endometrial carcinoma, she was referred  to me for further treatment plan.   She was seen for consultation on 11/05/20. She denied any history of HRT. The patient reported that her bleeding started just over 1 year  ago and was initially light and intermittent. She then began to experience monthly bleeding approximately 5 months prior, lasting for 15  days at a time, similar to a period. The patient reported intermittent abdominal cramping. She was scheduled to undergo a RH/BSO with possible staging on 11/20/20, however, during her preoperative evaluation, she  unfortunately tested positive for COVID 19. Her surgery was cancelled and she was asked to return to our office for additional preoperative evaluation after  her quarantine.  She returned 12/8/20 for preoperative evaluation. Her  testing revealed a normal level of 31.6 on 11/1/6/20. Aside from mild  congestion, she was relatively asymptomatic during her COVID infection. She denied any further VB over the last 2 months. She was  counseled about surgery and was scheduled for 1/8/2021 however did not obtain the surgery due to insurance denial. Surgical  authorization was obtained the following month, patient however was unable to return as she was moving.  She returned 4/20/21 for preoperative evaluation. She reported having recurrent intermittent vaginal bleeding and vaginal discharge present  since her last visit, lasting around  one week at a time, with bright red blood. She noted having pelvic cramping, however was not taking  anything for relief. A subsequent EMBx and biopsies of  the cervix in the 3 o'clock and 11 o'clock positions were obtained at this visit, as well as a pap smear. Pathological findings were unremarkable and showed no evidence of dysplasia or carcinoma, chronic inflammation in the cervical biopsies. Pap smear within normal limits and HPV negative. Robotic hysterectomy and bilateral salpingooophorectomy were recommended with possible pelvic and/or periaortic lymphadenectomy depending upon intraoperative findings and frozen section. Patient was counseled regarding all risks, benefits and alternatives including but not limited to infection, bleeding up to and requiring a transfusion, damage to surrounding organs including bowel, bladder, and ureter, pain, scarring, thromboembolism, and risks of anesthesia. Patient voiced understanding and desired to proceed. Informed consent was obtained.      Findings:   1. Normal-appearing upper abdomen to include the liver edge, diaphragm, omentum, and small bowel  2. Enlarged globular appearing 10 week sized uterus with normal-appearing right bilateral adnexa and cystic left ovarian lesion of approximately 3 cm  3. Smooth abdominal pelvic and cul-de-sac peritoneal surfaces with no evidence of tumor implant or seeding  4. Frozen section consistent with endometrial polyp and adenomyosis.      Procedure in detail: The patient was identified in the pre-operative holding area and brought to the operating room. Correct side and site were identified. Pre-operative antibiotics were administered prior to the procedure. GETA was smoothly induced. The patient was prepped and draped in the usual sterile fashion.  A Sam catheter was placed on the field.     An 8 mm incision was made at Knox's point and a Veress needle placed with confirmation of intra-abdominal position and low initial  insufflation pressure.  The abdomen was insufflated with carbon dioxide gas to 15 mmHg.  The 8 millimeter robotic trocar was then placed.  The camera was inserted and a survey of the abdomen and pelvis was performed with findings as noted above.  The patient was placed in steep Trendelenburg position. A supraumbilical 8 mm robotic port was then placed under direct visualization in the usual manner 8 cm lateral to the left upper quadrant port.  In the right upper quadrant 2 additional ports were placed in a similar manner.  The robot was brought to the patient bedside and docked following targeting.  The robotic instruments were placed. The surgeon scrubbed out to attend the console.     The left utero-ovarian pedicle was grasped and the uterus tented superiorly. A copious amount of small bowel filled the pelvis, requiring constant retraction during the case to adequately visualize the pelvic structures. The left broad ligament peritoneum lateral to the IP ligament was then incised parallel and the left retroperitoneal space entered.  The ureter was identified on the medial leaf of the broad ligament and the left ovarian vessels were isolated.  The ovarian pedicle was triply cauterized and transected.  The posterior leaf of the broad ligament peritoneum was then incised to the level of the uterosacral ligament with lateralization of the ureter on the side and development of the Ok[place]ayashi space.  We then turned our attention anteriorly and the round ligament was cauterized and transected.  The anterior leaf of the broad ligament was incised to the midline.  Care was taken to dissect the bladder inferiorly off of the lower uterine segment and cervix in layers.  The left uterine vessels were then isolated cauterized and transected.  The cardinal ligament was ligated and transected down to the level of the uterosacral ligament.  We then turned our attention to the right side where in a similar procedure was performed.      The cervical vaginal junction was identified and the vagina entered just below the right uterosacral ligament.  A sponge stick was placed from below to identify the correct plane.  The dissection was then continued circumferentially around the cervical vaginal junction with the cauterization of the remaining vaginal arteries at the 3 and 9 o'clock position.  The uterus was then freed and brought through the vaginal opening after being grasped with a single-tooth tenaculum.  The vagina was then closed in a double layer closure with an 0-Quill suture in a running fashion.      The abdomen and pelvis were copiously irrigated and excellent hemostasis was noted at this time.    Frozen section returned with an endometrial polyp and benign endometrium and myometrium with adenomyosis. As such, no staging was indicated.     The robotic instruments were withdrawn and the robot was undocked. The abdomen was desufflated at which time the ports were withdrawn.  The port sites were irrigated and the overlying skin reapproximated with 3-0 Monocryl suture in a subcuticular fashion.  Dressings were applied.  Patient tolerated the procedure well.  The patient was awakened from general anesthetic, and was taken to the recovery room in stable condition. Sponge and needle counts were correct at the end of the case.     Specimen: Uterus, cervix, bilateral fallopian tubes and ovaries for permanent specimen    EBL: 50 cc    Dispo: stable, extubated, to PACU

## 2021-05-28 NOTE — OR NURSING
Patient Ivorian Speaking only, iPad  [id number] and [id number] used.  Pre-op assessment complete, VSS stable, pt and family updated on POC. Call light within reach. Denies needs at this time.

## 2021-05-28 NOTE — OR SURGEON
Immediate Post OP Note    PreOp Diagnosis:   Postmenopausal bleeding   Thickened endometrial stripe   Obesity   History of colon cancer   Benign endometrial sampling     PostOp Diagnosis:   Postmenopausal bleeding   Thickened endometrial stripe   Obesity   History of colon cancer   Benign endometrial sampling     Procedure(s):  HYSTERECTOMY, ROBOT-ASSISTED, USING DA JOE XI - Wound Class: Clean Contaminated  SALPINGO-OOPHORECTOMY - Wound Class: Clean Contaminated    Surgeon(s):  JAZZY Berumen M.D.    Anesthesiologist/Type of Anesthesia:  Anesthesiologist: José Luis Mckenna M.D./General    Surgical Staff:  Circulator: Jenny Junior R.N.  Relief Circulator: Kimberly Snyder R.N.  Scrub Person: Jackie Bruce; Nancy Lehman    Specimens removed if any:  ID Type Source Tests Collected by Time Destination   A : uterus bilateral tubes overies and cervix with pathology  Tissue Uterus PATHOLOGY SPECIMEN Kenna Rodriges M.D. 5/28/2021  1:01 PM        Estimated Blood Loss: 50 cc    Findings:   1. Normal-appearing upper abdomen to include the liver edge, diaphragm, omentum, and small bowel  2. 10 week sized uterus with normal-appearing right bilateral adnexa and cystic left ovarian lesion  3. Smooth abdominal pelvic and cul-de-sac peritoneal surfaces with no evidence of tumor implant or seeding  4. Frozen section consistent with endometrial polyp and adenomyosis.      Complications: None        5/28/2021 1:56 PM Kenna Rodriges M.D.

## 2021-05-28 NOTE — OR NURSING
Patient in PACU in stable condition. VSS. Surgical dressings with small amount of blood. Will continue to monitor and medicate appropriately.

## 2021-05-28 NOTE — DISCHARGE INSTRUCTIONS
Instrucciones Para [place]  (Home Care Instructions)    ACTIVIDAD: Descanse y tome todo con mucha calma las primeras 24 horas después de bryant cirugía.  Gee persona adulta responsable debe permanecer con usted alta dano periodo de tiempo.  Es normal sentirse sonoliento o sonolienta alta esas primeras horas.  Le recomendamos que no iam nada que requiera equilibrio, lacy decisiones a mucha coordinación de bryant parte.    NO IAM ESTO PURANTE LAS PRIMERAS 24 HORAS:   Manejar o conducir algún vehiculo, operar maquinarias o utilizar electrodomesticos.   Beber cerveza o algún otro tipo de bebida alcohólica.   Lacy decisiones importantes o firmar documentos legales.    INSTRUCCIONES ESPECIALES:     [place] pélvico * 6 semanas Sin elevación alta 6 semanas Sin conducir alta 2 semanas Sigue [place] estaba programado. Sin conducir alta 2 semanas, sin relaciones sexuales, sin nada en la vagina, sin tampón, sin douching. Puede ducharse por la mañana. Puede subir las escaleras por la mañana. Puede quitar el aderezo y colocar vendas e intercambiar el vendaje diariamente alta gee semana. Llame si la herida es pavel, sensible y o caliente. Llame a la fiebre, náuseas, vómitos o sangrado vaginal.    DIETA: Para evitar las nauseas, prosiga despacito con bryant dieta a medida que pueda ir tolerándola mejor, evite comidas muy condimentadas o grasosas alta dano primer día.  Vaya agregando comidas más substanciadas a bryant dieta a medida que asi lo indique bryant médica.  Los bebés pueden beber leche preparada o formula, ásl nasima también leche del seno de la madre a medida que vayan teniendo hambre.  SIGA AGREGANDO LIQUIDOS Y COMIDAS CON FIBRA PARA EVITAR ESTREÑIMIENTO.    MEDICAMENTOS/MEDICINAS:  Vuelva a lacy aretha medicamentos diarios.  Literberry los medicamentos que se le prescribe con un poco de comida.  Si no le prescribe ningún tipo de medicamento, entonces puede lacy medicinas para el dolor que no contienen aspirina, si las necesita.   LAS MEDICINAS PARA EL DOLOR PUEDEN ESTREÑIRLE MUCHO.  [place] un suavizante para el excremento o materia fecal (stool softener) o un laxativo nasima por ejemplo: senokot, pericolase, o leche de magnesia, si lo necesita.    La ultima sosis de medicina para el dolor fue administrada  2:06pm.     Se debe hacer gee consulta medica con el doctor en según las instrucciones del Dr. Rodriges;, Líame para hacer la bimal.    Usted debe LIAMAR A BRYANT MEDICO si tiene los siguientes síntomas:   -   Gee fiebre más kalyn de 101 grados Fahrenheit.   -   Un dolor incesante aún con los medicamentos, o nauseas y vómito persistente.   -   Un sangrado excesivo (karen que traspasa los vendajes o gasas) o algúln tipo de drenaje inesperado que proviene de la henda.     -   Un color rodriges exagerado o hinchazón alrededor del área en donde se le hizo incisión o neal, o un drenaje de pus o con olor lisandra proveniente de la henda.   -    La inhabilidad de orinar o vaciar bryant vejiga en 8 horas.   -    Problemas con a respiración o cinthya en el pecho.    Usted debe llamar al 911 si se presentan problemas con el dolor al respirar o el pecho.  Si no se puede ponnoer en comunicación con un medica o con el centro de cirugía, usted debe ir a la estación de emergencia (emergency room) más cercana o a un centro de atención de urgencia (urgent care center).  El teléfono del medico es: [phone] Dr Rodriges    LOS SÍNTOMAS DE UN LEVE RESFRIO SON MUY NORMALES.  ADEMÁS USTED PUEDE LLEGAR A SENTIR CINTHYA GENERALES DE MÚSCULOS, IRRITACIÓN EN LA GARGANTA, CINTHYA DE KLEVER Y/O UN POCO DE NAUSEAS.    Sie tiene alguna pregunta, llame a bryant médico.  Si bryant médico no se encuentra disponible, por favor llame al Centro de Cirugía at [phone].  el Centro está abierto de Lunes a Viernes desde las 7:00 de la manana hasta las 5:00 de la noche.  Usted también puede llamar al CENTRO DE LLAMADAS SOBRE LA DAREN o HEALTH HOTLINE.  Mera está abierto viente y cuatro horas por aaron,  siete carrasco por semana, allí podrá hablar con gee enfermera.  Llame al [phone], o al número therese [phone].    Mi firma a continuación indica que he recibido y entiendco estas instrucciones acera de los cuidados en la casa (Home Care Instructions)    Usted recibirá gee encuesta en la correspondencia en las siguientes semanas y le pedimos que por favor tome un momento para completar tripp encuesta y regresaría a hosotros.  Nuestro objetivó es brindarle un cuidado muy macias y par lo tanto apreciamos aretha coméntanos.  Muchas yessi por darius escogido el [facility].      ACTIVITY: Rest and take it easy for the first 24 hours.  A responsible adult is recommended to remain with you during that time.  It is normal to feel sleepy.  We encourage you to not do anything that requires balance, judgment or coordination.    MILD FLU-LIKE SYMPTOMS ARE NORMAL. YOU MAY EXPERIENCE GENERALIZED MUSCLE ACHES, THROAT IRRITATION, HEADACHE AND/OR SOME NAUSEA.    FOR 24 HOURS DO NOT:  Drive, operate machinery or run household appliances.  Drink beer or alcoholic beverages.   Make important decisions or sign legal documents.    SPECIAL INSTRUCTIONS:   Pelvic rest * 6 weeks No lifting for 6 weeks No driving for 2 weeks F/U  As scheduled. No driving for 2 weeks, no intercourse, nothing in vagina, no tampon , no douching. May shower in the morning. May go up the stairs in the morning. May remove dressing and place bandaid and exchange the bandaid daily for one week. Please call if the wound is red, tender and or warm. Call for fever,nausea, vomiting, or vaginal bleeding.      DIET: To avoid nausea, slowly advance diet as tolerated, avoiding spicy or greasy foods for the first day.  Add more substantial food to your diet according to your physician's instructions.   INCREASE FLUIDS AND FIBER TO AVOID CONSTIPATION.    FOLLOW-UP APPOINTMENT:  A follow-up appointment should be arranged with  your doctor in as instructed by Dr Rodriges; call to schedule.    You should CALL YOUR PHYSICIAN if you develop:  Fever greater than 101 degrees F.  Pain not relieved by medication, or persistent nausea or vomiting.  Excessive bleeding (blood soaking through dressing) or unexpected drainage from the wound.  Extreme redness or swelling around the incision site, drainage of pus or foul smelling drainage.  Inability to urinate or empty your bladder within 8 hours.  Problems with breathing or chest pain.    You should call 911 if you develop problems with breathing or chest pain.  If you are unable to contact your doctor or surgical center, you should go to the nearest emergency room or urgent care center.    Physician's telephone #: [phone] Dr Rodriges    If any questions arise, call your doctor.  If your doctor is not available, please feel free to call the Surgical Center at [phone]. The Contact Center is open Monday through Friday 7AM to 5PM and may speak to a nurse at [phone], or toll free at [phone].     A registered nurse may call you a few days after your surgery to see how you are doing after your procedure.    MEDICATIONS: Resume taking daily medication.  Take prescribed pain medication with food.  If no medication is prescribed, you may take non-aspirin pain medication if needed.  PAIN MEDICATION CAN BE VERY CONSTIPATING.  Take a stool softener or laxative such as senokot, pericolace, or milk of magnesia if needed.    Last pain medication given at 2:06pm.    If your physician has prescribed pain medication that includes Acetaminophen (Tylenol), do not take additional Acetaminophen (Tylenol) while taking the prescribed medication.    Depression / Suicide Risk    As you are discharged from this [facility] facility, it is important to learn how to keep safe from harming yourself.    Recognize the warning signs:  · Abrupt changes in personality, positive or negative- including increase in  energy   · Giving away possessions  · Change in eating patterns- significant weight changes-  positive or negative  · Change in sleeping patterns- unable to sleep or sleeping all the time   · Unwillingness or inability to communicate  · Depression  · Unusual sadness, discouragement and loneliness  · Talk of wanting to die  · Neglect of personal appearance   · Rebelliousness- reckless behavior  · Withdrawal from people/activities they love  · Confusion- inability to concentrate     If you or a loved one observes any of these behaviors or has concerns about self-harm, here's what you can do:  · Talk about it- your feelings and reasons for harming yourself  · Remove any means that you might use to hurt yourself (examples: pills, rope, extension cords, firearm)  · Get professional help from the community (Mental Health, Substance Abuse, psychological counseling)  · Do not be alone:Call your Safe Contact- someone whom you trust who will be there for you.  · Call your local CRISIS HOTLINE 662-6729 or 791-913-9136  · Call your local Children's Mobile Crisis Response Team [place] (499) 856-4297 or www.[facility]  · Call the toll free National Suicide Prevention Hotlines   · National Suicide Prevention Lifeline 736-278-WZWK (4327)  · National Hope Line Network 800-SUICIDE (382-5149)

## 2021-05-29 NOTE — ANESTHESIA POSTPROCEDURE EVALUATION
Patient: Karthik Gaona    Procedure Summary     Date: 05/28/21 Room / Location: [place] [address] / SURGERY [facility]    Anesthesia Start: 1146 Anesthesia Stop: 1349    Procedures:       HYSTERECTOMY, ROBOT-ASSISTED, USING DA JOE XI (Pelvis)      SALPINGO-OOPHORECTOMY (Bilateral Pelvis) Diagnosis: (BLEEDING)    Surgeons: Kenna Rodriges M.D. Responsible Provider: José Luis Mckenna M.D.    Anesthesia Type: general ASA Status: 3          Final Anesthesia Type: general  Last vitals  BP   Blood Pressure: 115/65    Temp   36.7 °C (98 °F)    Pulse   91   Resp   12    SpO2   96 %      Anesthesia Post Evaluation    Patient location during evaluation: PACU  Patient participation: complete - patient participated  Level of consciousness: awake and alert  Pain score: 2    Airway patency: patent  Anesthetic complications: no  Cardiovascular status: hemodynamically stable  Respiratory status: acceptable  Hydration status: euvolemic    PONV: none          There were no known complications for this encounter.     Nurse Pain Score: 2 (NPRS)

## 2021-05-29 NOTE — OR NURSING
Pt arrived in Phase 2 at 1555, ambulated to recliner chair, 4 abominal lap site gauze/transparent film DSG with small red drainage present on gauze, VSS, complains of tolerable 7-8 of 10 aching abdominal pain and nausea, requests nausea meds, denies need for additional pain medications,  and personal belongings at bedside, Zofran 4mg IV adm at 1618, discharge instructions and Rx reviewed wit pt and  via IPAD  in Armenian, they verbalized understanding of instructions, pt ambulated to BR, able to void, scant serous sanguineous drainage present on marvel pad, pt denies nausea, states abdominal pain is 2 of 10 aching pain level, states she feels ready to go home, PIV removed, tip intact, discharged via wheelchair to home with  at 1742.

## 2023-11-08 PROBLEM — M25.562 CHRONIC PAIN OF LEFT KNEE: Status: ACTIVE | Noted: 2023-11-08

## 2023-11-08 PROBLEM — M17.12 TRICOMPARTMENT OSTEOARTHRITIS OF LEFT KNEE: Status: ACTIVE | Noted: 2023-11-08

## 2023-11-08 PROBLEM — G89.29 CHRONIC PAIN OF LEFT KNEE: Status: ACTIVE | Noted: 2023-11-08

## 2023-12-07 ENCOUNTER — APPOINTMENT (OUTPATIENT)
Dept: ADMISSIONS | Facility: MEDICAL CENTER | Age: 63
End: 2023-12-07
Attending: ORTHOPAEDIC SURGERY
Payer: COMMERCIAL

## 2023-12-15 ENCOUNTER — PRE-ADMISSION TESTING (OUTPATIENT)
Dept: ADMISSIONS | Facility: MEDICAL CENTER | Age: 63
End: 2023-12-15
Attending: ORTHOPAEDIC SURGERY
Payer: COMMERCIAL

## 2023-12-15 VITALS — BODY MASS INDEX: 29.79 KG/M2 | HEIGHT: 59 IN

## 2023-12-15 DIAGNOSIS — Z01.812 PRE-OPERATIVE LABORATORY EXAMINATION: ICD-10-CM

## 2023-12-15 RX ORDER — IBUPROFEN 200 MG
600 TABLET ORAL EVERY 6 HOURS PRN
COMMUNITY

## 2023-12-15 RX ORDER — OMEPRAZOLE 20 MG/1
20 CAPSULE, DELAYED RELEASE ORAL DAILY
COMMUNITY

## 2023-12-15 RX ORDER — ACETAMINOPHEN 325 MG/1
650 TABLET ORAL EVERY 4 HOURS PRN
COMMUNITY

## 2023-12-15 NOTE — PREPROCEDURE INSTRUCTIONS
Pt preadmitted via phone, instructions emailed. Questions answered.Patient instructed per pharmacy guidelines regarding taking, holding or contacting provider for instructions on regularly prescribed medications before surgery. Instructed to take the following medications the day of surgery with a sip of water per pharmacy medication guidelines- omeprazole, synthroid and if needed tylenol. METs score >4. Placed on fall risk precautions per score and hx. Pt states had sleep study and has HEATHER but no follow up to get a device.   
Yes...

## 2023-12-28 ENCOUNTER — PRE-ADMISSION TESTING (OUTPATIENT)
Dept: ADMISSIONS | Facility: MEDICAL CENTER | Age: 63
End: 2023-12-28
Attending: ORTHOPAEDIC SURGERY
Payer: COMMERCIAL

## 2023-12-28 DIAGNOSIS — Z01.812 PRE-OPERATIVE LABORATORY EXAMINATION: ICD-10-CM

## 2023-12-28 DIAGNOSIS — M17.9 OSTEOARTHRITIS OF KNEE, UNSPECIFIED: ICD-10-CM

## 2023-12-28 LAB
ALBUMIN SERPL BCP-MCNC: 4.6 G/DL (ref 3.2–4.9)
ALBUMIN/GLOB SERPL: 1.6 G/DL
ALP SERPL-CCNC: 87 U/L (ref 30–99)
ALT SERPL-CCNC: 20 U/L (ref 2–50)
ANION GAP SERPL CALC-SCNC: 17 MMOL/L (ref 7–16)
AST SERPL-CCNC: 17 U/L (ref 12–45)
BILIRUB SERPL-MCNC: 0.6 MG/DL (ref 0.1–1.5)
BUN SERPL-MCNC: 15 MG/DL (ref 8–22)
CALCIUM ALBUM COR SERPL-MCNC: 9.5 MG/DL (ref 8.5–10.5)
CALCIUM SERPL-MCNC: 10 MG/DL (ref 8.4–10.2)
CHLORIDE SERPL-SCNC: 100 MMOL/L (ref 96–112)
CO2 SERPL-SCNC: 23 MMOL/L (ref 20–33)
CREAT SERPL-MCNC: 0.52 MG/DL (ref 0.5–1.4)
ERYTHROCYTE [DISTWIDTH] IN BLOOD BY AUTOMATED COUNT: 40.7 FL (ref 35.9–50)
GFR SERPLBLD CREATININE-BSD FMLA CKD-EPI: 105 ML/MIN/1.73 M 2
GLOBULIN SER CALC-MCNC: 2.9 G/DL (ref 1.9–3.5)
GLUCOSE SERPL-MCNC: 95 MG/DL (ref 65–99)
HCT VFR BLD AUTO: 48.3 % (ref 37–47)
HGB BLD-MCNC: 16.1 G/DL (ref 12–16)
MCH RBC QN AUTO: 30 PG (ref 27–33)
MCHC RBC AUTO-ENTMCNC: 33.3 G/DL (ref 32.2–35.5)
MCV RBC AUTO: 89.9 FL (ref 81.4–97.8)
PLATELET # BLD AUTO: 176 K/UL (ref 164–446)
PMV BLD AUTO: 11.9 FL (ref 9–12.9)
POTASSIUM SERPL-SCNC: 4.3 MMOL/L (ref 3.6–5.5)
PROT SERPL-MCNC: 7.5 G/DL (ref 6–8.2)
RBC # BLD AUTO: 5.37 M/UL (ref 4.2–5.4)
SCCMEC + MECA PNL NOSE NAA+PROBE: NEGATIVE
SCCMEC + MECA PNL NOSE NAA+PROBE: NEGATIVE
SODIUM SERPL-SCNC: 140 MMOL/L (ref 135–145)
WBC # BLD AUTO: 8.1 K/UL (ref 4.8–10.8)

## 2023-12-28 PROCEDURE — 85027 COMPLETE CBC AUTOMATED: CPT

## 2023-12-28 PROCEDURE — 36415 COLL VENOUS BLD VENIPUNCTURE: CPT

## 2023-12-28 PROCEDURE — 87640 STAPH A DNA AMP PROBE: CPT

## 2023-12-28 PROCEDURE — 87641 MR-STAPH DNA AMP PROBE: CPT

## 2023-12-28 PROCEDURE — 80053 COMPREHEN METABOLIC PANEL: CPT

## 2023-12-28 NOTE — DISCHARGE PLANNING
"  DISCHARGE PLANNING NOTE - TOTAL JOINT    Procedure: Procedure(s):  ARTHROPLASTY, KNEE, TOTAL  Procedure Date: 1/11/2024  Insurance: Payor: SHERMAN / Plan: [employer] BCBS / Product Type: *No Product type* /    Equipment currently available at home?   none  Steps into the home? 0  Steps within the home? 0  Toilet height? Standard 4'11\"  Type of shower? tub-shower  Who will be with you during your recovery? spouse  Is Outpatient Physical Therapy set up after surgery? Yes  Did you take the Total Joint Class and where? No  NAON booklet and link to online joint class given to pt   Planning same day discharge? Unsure     Plan:     Pt is [place] speaking, this RN CM is qualified via this facility to speak to pt in [place].     RN GIANA met with pt during scheduled CM appointment. Home safety check list, medical equipment list, shower instructions and MRSA swab information reviewed and given to patient. MRSA swab collected and shower kit given to pt. Recommended front wheel walker and tub transfer bench. Discharge criteria discussed with patient. All questions and concerns addressed. Anticipate discharge home with no barriers.       "

## 2024-01-11 ENCOUNTER — APPOINTMENT (OUTPATIENT)
Dept: RADIOLOGY | Facility: MEDICAL CENTER | Age: 64
End: 2024-01-11
Attending: ORTHOPAEDIC SURGERY
Payer: COMMERCIAL

## 2024-01-11 ENCOUNTER — HOSPITAL ENCOUNTER (OUTPATIENT)
Facility: MEDICAL CENTER | Age: 64
End: 2024-01-11
Attending: ORTHOPAEDIC SURGERY | Admitting: ORTHOPAEDIC SURGERY
Payer: COMMERCIAL

## 2024-01-11 ENCOUNTER — ANESTHESIA EVENT (OUTPATIENT)
Dept: SURGERY | Facility: MEDICAL CENTER | Age: 64
End: 2024-01-11
Payer: COMMERCIAL

## 2024-01-11 ENCOUNTER — ANESTHESIA (OUTPATIENT)
Dept: SURGERY | Facility: MEDICAL CENTER | Age: 64
End: 2024-01-11
Payer: COMMERCIAL

## 2024-01-11 VITALS
RESPIRATION RATE: 18 BRPM | OXYGEN SATURATION: 96 % | HEIGHT: 59 IN | SYSTOLIC BLOOD PRESSURE: 130 MMHG | TEMPERATURE: 97.9 F | DIASTOLIC BLOOD PRESSURE: 72 MMHG | BODY MASS INDEX: 28.58 KG/M2 | HEART RATE: 90 BPM | WEIGHT: 141.76 LBS

## 2024-01-11 DIAGNOSIS — Z96.652 S/P TOTAL KNEE ARTHROPLASTY, LEFT: ICD-10-CM

## 2024-01-11 DIAGNOSIS — M17.12 TRICOMPARTMENT OSTEOARTHRITIS OF LEFT KNEE: ICD-10-CM

## 2024-01-11 LAB — GLUCOSE BLD STRIP.AUTO-MCNC: 88 MG/DL (ref 65–99)

## 2024-01-11 PROCEDURE — 160048 HCHG OR STATISTICAL LEVEL 1-5: Performed by: ORTHOPAEDIC SURGERY

## 2024-01-11 PROCEDURE — 160025 RECOVERY II MINUTES (STATS): Performed by: ORTHOPAEDIC SURGERY

## 2024-01-11 PROCEDURE — 502000 HCHG MISC OR IMPLANTS RC 0278: Performed by: ORTHOPAEDIC SURGERY

## 2024-01-11 PROCEDURE — 160041 HCHG SURGERY MINUTES - EA ADDL 1 MIN LEVEL 4: Performed by: ORTHOPAEDIC SURGERY

## 2024-01-11 PROCEDURE — 700111 HCHG RX REV CODE 636 W/ 250 OVERRIDE (IP): Performed by: ANESTHESIOLOGY

## 2024-01-11 PROCEDURE — 700105 HCHG RX REV CODE 258: Performed by: ORTHOPAEDIC SURGERY

## 2024-01-11 PROCEDURE — 97161 PT EVAL LOW COMPLEX 20 MIN: CPT

## 2024-01-11 PROCEDURE — 502240 HCHG MISC OR SUPPLY RC 0272: Performed by: ORTHOPAEDIC SURGERY

## 2024-01-11 PROCEDURE — 160035 HCHG PACU - 1ST 60 MINS PHASE I: Performed by: ORTHOPAEDIC SURGERY

## 2024-01-11 PROCEDURE — 700102 HCHG RX REV CODE 250 W/ 637 OVERRIDE(OP): Performed by: ANESTHESIOLOGY

## 2024-01-11 PROCEDURE — 160002 HCHG RECOVERY MINUTES (STAT): Performed by: ORTHOPAEDIC SURGERY

## 2024-01-11 PROCEDURE — 160009 HCHG ANES TIME/MIN: Performed by: ORTHOPAEDIC SURGERY

## 2024-01-11 PROCEDURE — 700101 HCHG RX REV CODE 250: Performed by: ORTHOPAEDIC SURGERY

## 2024-01-11 PROCEDURE — 160047 HCHG PACU  - EA ADDL 30 MINS PHASE II: Performed by: ORTHOPAEDIC SURGERY

## 2024-01-11 PROCEDURE — 700101 HCHG RX REV CODE 250: Performed by: ANESTHESIOLOGY

## 2024-01-11 PROCEDURE — 27447 TOTAL KNEE ARTHROPLASTY: CPT | Mod: LT | Performed by: ORTHOPAEDIC SURGERY

## 2024-01-11 PROCEDURE — 160036 HCHG PACU - EA ADDL 30 MINS PHASE I: Performed by: ORTHOPAEDIC SURGERY

## 2024-01-11 PROCEDURE — 97116 GAIT TRAINING THERAPY: CPT

## 2024-01-11 PROCEDURE — A9270 NON-COVERED ITEM OR SERVICE: HCPCS | Performed by: ANESTHESIOLOGY

## 2024-01-11 PROCEDURE — 73560 X-RAY EXAM OF KNEE 1 OR 2: CPT | Mod: LT

## 2024-01-11 PROCEDURE — 160046 HCHG PACU - 1ST 60 MINS PHASE II: Performed by: ORTHOPAEDIC SURGERY

## 2024-01-11 PROCEDURE — 700111 HCHG RX REV CODE 636 W/ 250 OVERRIDE (IP): Mod: JZ | Performed by: ANESTHESIOLOGY

## 2024-01-11 PROCEDURE — 97110 THERAPEUTIC EXERCISES: CPT

## 2024-01-11 PROCEDURE — 160029 HCHG SURGERY MINUTES - 1ST 30 MINS LEVEL 4: Performed by: ORTHOPAEDIC SURGERY

## 2024-01-11 PROCEDURE — C1776 JOINT DEVICE (IMPLANTABLE): HCPCS | Performed by: ORTHOPAEDIC SURGERY

## 2024-01-11 PROCEDURE — 700111 HCHG RX REV CODE 636 W/ 250 OVERRIDE (IP): Mod: JZ | Performed by: ORTHOPAEDIC SURGERY

## 2024-01-11 PROCEDURE — 82962 GLUCOSE BLOOD TEST: CPT

## 2024-01-11 PROCEDURE — 97535 SELF CARE MNGMENT TRAINING: CPT

## 2024-01-11 PROCEDURE — 64447 NJX AA&/STRD FEMORAL NRV IMG: CPT | Performed by: ORTHOPAEDIC SURGERY

## 2024-01-11 DEVICE — BASEPLATE TIBIAL TRIATHLON TRITANIUM SIZE 3 (1EA): Type: IMPLANTABLE DEVICE | Site: KNEE | Status: FUNCTIONAL

## 2024-01-11 DEVICE — IMPLANTABLE DEVICE: Type: IMPLANTABLE DEVICE | Site: KNEE | Status: FUNCTIONAL

## 2024-01-11 DEVICE — INSERT TIBIAL TRIATHLON X3 CONDYLAR STABILIZE SIZE 3 9MM (1EA): Type: IMPLANTABLE DEVICE | Site: KNEE | Status: FUNCTIONAL

## 2024-01-11 RX ORDER — LABETALOL HYDROCHLORIDE 5 MG/ML
5 INJECTION, SOLUTION INTRAVENOUS
Status: DISCONTINUED | OUTPATIENT
Start: 2024-01-11 | End: 2024-01-11 | Stop reason: HOSPADM

## 2024-01-11 RX ORDER — OXYCODONE HCL 5 MG/5 ML
10 SOLUTION, ORAL ORAL
Status: COMPLETED | OUTPATIENT
Start: 2024-01-11 | End: 2024-01-11

## 2024-01-11 RX ORDER — ONDANSETRON 2 MG/ML
4 INJECTION INTRAMUSCULAR; INTRAVENOUS EVERY 4 HOURS PRN
Status: CANCELLED | OUTPATIENT
Start: 2024-01-11

## 2024-01-11 RX ORDER — HYDRALAZINE HYDROCHLORIDE 20 MG/ML
5 INJECTION INTRAMUSCULAR; INTRAVENOUS
Status: DISCONTINUED | OUTPATIENT
Start: 2024-01-11 | End: 2024-01-11 | Stop reason: HOSPADM

## 2024-01-11 RX ORDER — VANCOMYCIN HYDROCHLORIDE 1 G/20ML
INJECTION, POWDER, LYOPHILIZED, FOR SOLUTION INTRAVENOUS
Status: COMPLETED | OUTPATIENT
Start: 2024-01-11 | End: 2024-01-11

## 2024-01-11 RX ORDER — SCOLOPAMINE TRANSDERMAL SYSTEM 1 MG/1
1 PATCH, EXTENDED RELEASE TRANSDERMAL
Status: CANCELLED | OUTPATIENT
Start: 2024-01-11

## 2024-01-11 RX ORDER — DEXAMETHASONE SODIUM PHOSPHATE 4 MG/ML
4 INJECTION, SOLUTION INTRA-ARTICULAR; INTRALESIONAL; INTRAMUSCULAR; INTRAVENOUS; SOFT TISSUE
Status: CANCELLED | OUTPATIENT
Start: 2024-01-11

## 2024-01-11 RX ORDER — DIPHENHYDRAMINE HYDROCHLORIDE 50 MG/ML
12.5 INJECTION INTRAMUSCULAR; INTRAVENOUS
Status: DISCONTINUED | OUTPATIENT
Start: 2024-01-11 | End: 2024-01-11 | Stop reason: HOSPADM

## 2024-01-11 RX ORDER — SODIUM CHLORIDE, SODIUM LACTATE, POTASSIUM CHLORIDE, CALCIUM CHLORIDE 600; 310; 30; 20 MG/100ML; MG/100ML; MG/100ML; MG/100ML
INJECTION, SOLUTION INTRAVENOUS CONTINUOUS
Status: ACTIVE | OUTPATIENT
Start: 2024-01-11 | End: 2024-01-11

## 2024-01-11 RX ORDER — BISACODYL 10 MG
10 SUPPOSITORY, RECTAL RECTAL
Status: CANCELLED | OUTPATIENT
Start: 2024-01-11

## 2024-01-11 RX ORDER — ONDANSETRON 2 MG/ML
4 INJECTION INTRAMUSCULAR; INTRAVENOUS
Status: DISCONTINUED | OUTPATIENT
Start: 2024-01-11 | End: 2024-01-11 | Stop reason: HOSPADM

## 2024-01-11 RX ORDER — AMOXICILLIN 250 MG
1 CAPSULE ORAL
Status: CANCELLED | OUTPATIENT
Start: 2024-01-11

## 2024-01-11 RX ORDER — BUPIVACAINE HYDROCHLORIDE 5 MG/ML
INJECTION, SOLUTION EPIDURAL; INTRACAUDAL PRN
Status: DISCONTINUED | OUTPATIENT
Start: 2024-01-11 | End: 2024-01-11 | Stop reason: SURG

## 2024-01-11 RX ORDER — OXYCODONE HCL 5 MG/5 ML
5 SOLUTION, ORAL ORAL
Status: COMPLETED | OUTPATIENT
Start: 2024-01-11 | End: 2024-01-11

## 2024-01-11 RX ORDER — TRANEXAMIC ACID 100 MG/ML
INJECTION, SOLUTION INTRAVENOUS PRN
Status: DISCONTINUED | OUTPATIENT
Start: 2024-01-11 | End: 2024-01-11 | Stop reason: SURG

## 2024-01-11 RX ORDER — POLYETHYLENE GLYCOL 3350 17 G/17G
1 POWDER, FOR SOLUTION ORAL 2 TIMES DAILY PRN
Status: CANCELLED | OUTPATIENT
Start: 2024-01-11

## 2024-01-11 RX ORDER — ONDANSETRON 2 MG/ML
INJECTION INTRAMUSCULAR; INTRAVENOUS PRN
Status: DISCONTINUED | OUTPATIENT
Start: 2024-01-11 | End: 2024-01-11 | Stop reason: SURG

## 2024-01-11 RX ORDER — OXYCODONE HYDROCHLORIDE 10 MG/1
10 TABLET ORAL
Status: CANCELLED | OUTPATIENT
Start: 2024-01-11

## 2024-01-11 RX ORDER — HYDROMORPHONE HYDROCHLORIDE 1 MG/ML
0.5 INJECTION, SOLUTION INTRAMUSCULAR; INTRAVENOUS; SUBCUTANEOUS
Status: CANCELLED | OUTPATIENT
Start: 2024-01-11

## 2024-01-11 RX ORDER — CEFAZOLIN SODIUM 1 G/3ML
INJECTION, POWDER, FOR SOLUTION INTRAMUSCULAR; INTRAVENOUS PRN
Status: DISCONTINUED | OUTPATIENT
Start: 2024-01-11 | End: 2024-01-11 | Stop reason: SURG

## 2024-01-11 RX ORDER — MIDAZOLAM HYDROCHLORIDE 1 MG/ML
INJECTION INTRAMUSCULAR; INTRAVENOUS PRN
Status: DISCONTINUED | OUTPATIENT
Start: 2024-01-11 | End: 2024-01-11 | Stop reason: SURG

## 2024-01-11 RX ORDER — AMOXICILLIN 250 MG
1 CAPSULE ORAL NIGHTLY
Status: CANCELLED | OUTPATIENT
Start: 2024-01-11

## 2024-01-11 RX ORDER — ACETAMINOPHEN 500 MG
1000 TABLET ORAL ONCE
Status: COMPLETED | OUTPATIENT
Start: 2024-01-11 | End: 2024-01-11

## 2024-01-11 RX ORDER — MIDAZOLAM HYDROCHLORIDE 1 MG/ML
1 INJECTION INTRAMUSCULAR; INTRAVENOUS
Status: DISCONTINUED | OUTPATIENT
Start: 2024-01-11 | End: 2024-01-11 | Stop reason: HOSPADM

## 2024-01-11 RX ORDER — HYDROMORPHONE HYDROCHLORIDE 1 MG/ML
0.1 INJECTION, SOLUTION INTRAMUSCULAR; INTRAVENOUS; SUBCUTANEOUS
Status: DISCONTINUED | OUTPATIENT
Start: 2024-01-11 | End: 2024-01-11 | Stop reason: HOSPADM

## 2024-01-11 RX ORDER — ROCURONIUM BROMIDE 10 MG/ML
INJECTION, SOLUTION INTRAVENOUS PRN
Status: DISCONTINUED | OUTPATIENT
Start: 2024-01-11 | End: 2024-01-11 | Stop reason: SURG

## 2024-01-11 RX ORDER — HALOPERIDOL 5 MG/ML
1 INJECTION INTRAMUSCULAR EVERY 6 HOURS PRN
Status: CANCELLED | OUTPATIENT
Start: 2024-01-11

## 2024-01-11 RX ORDER — HALOPERIDOL 5 MG/ML
1 INJECTION INTRAMUSCULAR
Status: DISCONTINUED | OUTPATIENT
Start: 2024-01-11 | End: 2024-01-11 | Stop reason: HOSPADM

## 2024-01-11 RX ORDER — EPHEDRINE SULFATE 50 MG/ML
5 INJECTION, SOLUTION INTRAVENOUS
Status: DISCONTINUED | OUTPATIENT
Start: 2024-01-11 | End: 2024-01-11 | Stop reason: HOSPADM

## 2024-01-11 RX ORDER — HYDROMORPHONE HYDROCHLORIDE 1 MG/ML
0.2 INJECTION, SOLUTION INTRAMUSCULAR; INTRAVENOUS; SUBCUTANEOUS
Status: DISCONTINUED | OUTPATIENT
Start: 2024-01-11 | End: 2024-01-11 | Stop reason: HOSPADM

## 2024-01-11 RX ORDER — DOCUSATE SODIUM 100 MG/1
100 CAPSULE, LIQUID FILLED ORAL 2 TIMES DAILY
Status: CANCELLED | OUTPATIENT
Start: 2024-01-11

## 2024-01-11 RX ORDER — BUPIVACAINE HYDROCHLORIDE AND EPINEPHRINE 5; 5 MG/ML; UG/ML
INJECTION, SOLUTION EPIDURAL; INTRACAUDAL; PERINEURAL
Status: DISCONTINUED | OUTPATIENT
Start: 2024-01-11 | End: 2024-01-11 | Stop reason: HOSPADM

## 2024-01-11 RX ORDER — DEXAMETHASONE SODIUM PHOSPHATE 4 MG/ML
INJECTION, SOLUTION INTRA-ARTICULAR; INTRALESIONAL; INTRAMUSCULAR; INTRAVENOUS; SOFT TISSUE PRN
Status: DISCONTINUED | OUTPATIENT
Start: 2024-01-11 | End: 2024-01-11 | Stop reason: SURG

## 2024-01-11 RX ORDER — GABAPENTIN 300 MG/1
300 CAPSULE ORAL ONCE
Status: COMPLETED | OUTPATIENT
Start: 2024-01-11 | End: 2024-01-11

## 2024-01-11 RX ORDER — ASPIRIN 81 MG/1
81 TABLET ORAL 2 TIMES DAILY
Status: CANCELLED | OUTPATIENT
Start: 2024-01-11

## 2024-01-11 RX ORDER — DIPHENHYDRAMINE HYDROCHLORIDE 50 MG/ML
25 INJECTION INTRAMUSCULAR; INTRAVENOUS EVERY 6 HOURS PRN
Status: CANCELLED | OUTPATIENT
Start: 2024-01-11

## 2024-01-11 RX ORDER — MEPERIDINE HYDROCHLORIDE 25 MG/ML
6.25 INJECTION INTRAMUSCULAR; INTRAVENOUS; SUBCUTANEOUS
Status: DISCONTINUED | OUTPATIENT
Start: 2024-01-11 | End: 2024-01-11 | Stop reason: HOSPADM

## 2024-01-11 RX ORDER — KETOROLAC TROMETHAMINE 30 MG/ML
INJECTION, SOLUTION INTRAMUSCULAR; INTRAVENOUS PRN
Status: DISCONTINUED | OUTPATIENT
Start: 2024-01-11 | End: 2024-01-11 | Stop reason: SURG

## 2024-01-11 RX ORDER — CEFAZOLIN SODIUM 1 G/3ML
2 INJECTION, POWDER, FOR SOLUTION INTRAMUSCULAR; INTRAVENOUS ONCE
Status: DISCONTINUED | OUTPATIENT
Start: 2024-01-11 | End: 2024-01-11 | Stop reason: HOSPADM

## 2024-01-11 RX ORDER — CELECOXIB 200 MG/1
200 CAPSULE ORAL ONCE
Status: COMPLETED | OUTPATIENT
Start: 2024-01-11 | End: 2024-01-11

## 2024-01-11 RX ORDER — OXYCODONE HYDROCHLORIDE 5 MG/1
5 TABLET ORAL
Status: CANCELLED | OUTPATIENT
Start: 2024-01-11

## 2024-01-11 RX ORDER — LIDOCAINE HYDROCHLORIDE 20 MG/ML
INJECTION, SOLUTION EPIDURAL; INFILTRATION; INTRACAUDAL; PERINEURAL PRN
Status: DISCONTINUED | OUTPATIENT
Start: 2024-01-11 | End: 2024-01-11 | Stop reason: SURG

## 2024-01-11 RX ORDER — HYDROMORPHONE HYDROCHLORIDE 1 MG/ML
0.4 INJECTION, SOLUTION INTRAMUSCULAR; INTRAVENOUS; SUBCUTANEOUS
Status: DISCONTINUED | OUTPATIENT
Start: 2024-01-11 | End: 2024-01-11 | Stop reason: HOSPADM

## 2024-01-11 RX ORDER — ENEMA 19; 7 G/133ML; G/133ML
1 ENEMA RECTAL
Status: CANCELLED | OUTPATIENT
Start: 2024-01-11

## 2024-01-11 RX ADMIN — PROPOFOL 150 MG: 10 INJECTION, EMULSION INTRAVENOUS at 11:53

## 2024-01-11 RX ADMIN — OXYCODONE HYDROCHLORIDE 5 MG: 5 SOLUTION ORAL at 13:14

## 2024-01-11 RX ADMIN — BUPIVACAINE HYDROCHLORIDE 20 ML: 5 INJECTION, SOLUTION EPIDURAL; INTRACAUDAL at 11:39

## 2024-01-11 RX ADMIN — LIDOCAINE HYDROCHLORIDE 100 MG: 20 INJECTION, SOLUTION EPIDURAL; INFILTRATION; INTRACAUDAL at 11:39

## 2024-01-11 RX ADMIN — TRANEXAMIC ACID 1000 MG: 100 INJECTION, SOLUTION INTRAVENOUS at 11:57

## 2024-01-11 RX ADMIN — TRANEXAMIC ACID 1000 MG: 100 INJECTION, SOLUTION INTRAVENOUS at 12:35

## 2024-01-11 RX ADMIN — ROCURONIUM BROMIDE 50 MG: 50 INJECTION, SOLUTION INTRAVENOUS at 11:53

## 2024-01-11 RX ADMIN — GABAPENTIN 300 MG: 300 CAPSULE ORAL at 11:07

## 2024-01-11 RX ADMIN — FENTANYL CITRATE 25 MCG: 50 INJECTION, SOLUTION INTRAMUSCULAR; INTRAVENOUS at 13:16

## 2024-01-11 RX ADMIN — DEXAMETHASONE SODIUM PHOSPHATE 10 MG: 4 INJECTION INTRA-ARTICULAR; INTRALESIONAL; INTRAMUSCULAR; INTRAVENOUS; SOFT TISSUE at 11:57

## 2024-01-11 RX ADMIN — ONDANSETRON 4 MG: 2 INJECTION INTRAMUSCULAR; INTRAVENOUS at 12:35

## 2024-01-11 RX ADMIN — KETOROLAC TROMETHAMINE 30 MG: 30 INJECTION, SOLUTION INTRAMUSCULAR; INTRAVENOUS at 12:35

## 2024-01-11 RX ADMIN — MIDAZOLAM HYDROCHLORIDE 2 MG: 1 INJECTION, SOLUTION INTRAMUSCULAR; INTRAVENOUS at 11:38

## 2024-01-11 RX ADMIN — CELECOXIB 200 MG: 200 CAPSULE ORAL at 11:07

## 2024-01-11 RX ADMIN — CEFAZOLIN 2 G: 1 INJECTION, POWDER, FOR SOLUTION INTRAMUSCULAR; INTRAVENOUS at 11:49

## 2024-01-11 RX ADMIN — SODIUM CHLORIDE, POTASSIUM CHLORIDE, SODIUM LACTATE AND CALCIUM CHLORIDE: 600; 310; 30; 20 INJECTION, SOLUTION INTRAVENOUS at 11:07

## 2024-01-11 RX ADMIN — SUGAMMADEX 200 MG: 100 INJECTION, SOLUTION INTRAVENOUS at 12:38

## 2024-01-11 RX ADMIN — FENTANYL CITRATE 50 MCG: 50 INJECTION, SOLUTION INTRAMUSCULAR; INTRAVENOUS at 12:35

## 2024-01-11 RX ADMIN — FENTANYL CITRATE 50 MCG: 50 INJECTION, SOLUTION INTRAMUSCULAR; INTRAVENOUS at 13:32

## 2024-01-11 RX ADMIN — ACETAMINOPHEN 1000 MG: 500 TABLET ORAL at 11:07

## 2024-01-11 RX ADMIN — FENTANYL CITRATE 100 MCG: 50 INJECTION, SOLUTION INTRAMUSCULAR; INTRAVENOUS at 11:53

## 2024-01-11 ASSESSMENT — PAIN DESCRIPTION - PAIN TYPE
TYPE: SURGICAL PAIN
TYPE: ACUTE PAIN
TYPE: SURGICAL PAIN

## 2024-01-11 ASSESSMENT — COGNITIVE AND FUNCTIONAL STATUS - GENERAL
SUGGESTED CMS G CODE MODIFIER MOBILITY: CJ
MOBILITY SCORE: 22
CLIMB 3 TO 5 STEPS WITH RAILING: A LITTLE
WALKING IN HOSPITAL ROOM: A LITTLE

## 2024-01-11 ASSESSMENT — PAIN SCALES - GENERAL: PAIN_LEVEL: 6

## 2024-01-11 ASSESSMENT — GAIT ASSESSMENTS
DEVIATION: ANTALGIC;STEP TO
ASSISTIVE DEVICE: FRONT WHEEL WALKER
GAIT LEVEL OF ASSIST: STANDBY ASSIST
DISTANCE (FEET): 120

## 2024-01-11 ASSESSMENT — FIBROSIS 4 INDEX: FIB4 SCORE: 1.34

## 2024-01-11 NOTE — ANESTHESIA PROCEDURE NOTES
Airway    Date/Time: 1/11/2024 11:54 AM    Performed by: Tashia Alexander M.D.  Authorized by: Tashia Alexander M.D.    Location:  OR  Urgency:  Elective  Indications for Airway Management:  Anesthesia      Spontaneous Ventilation: absent    Sedation Level:  Deep  Preoxygenated: Yes    Patient Position:  Sniffing  Mask Difficulty Assessment:  1 - vent by mask  Final Airway Type:  Endotracheal airway  Final Endotracheal Airway:  ETT  Cuffed: Yes    Technique Used for Successful ETT Placement:  Direct laryngoscopy  Devices/Methods Used in Placement:  Intubating stylet    Insertion Site:  Oral  Blade Type:  Taryn  Laryngoscope Blade/Videolaryngoscope Blade Size:  3  ETT Size (mm):  7.0  Measured from:  Teeth  ETT to Teeth (cm):  21  Placement Verified by: auscultation and capnometry    Cormack-Lehane Classification:  Grade I - full view of glottis  Number of Attempts at Approach:  1

## 2024-01-11 NOTE — OR NURSING
1500 Pt arrival to stage II, VSS.   1505 Pt given juice and crackers, daughter and spouse at chairside. Pt would like to wait to get dressed. Pt awaiting eval w/ PT/OT.   1545 Pt agrees to get dressed at this time, pt dressed and back recliner awaiting PT/OT.   1600 PT at chairside educating pt.   1645 Pt and family educated on D/C instructions.   1700 Pt voids successfully.   1706 Pt D/C home to care of family via wheelchair.

## 2024-01-11 NOTE — ANESTHESIA PROCEDURE NOTES
Peripheral Block    Date/Time: 1/11/2024 11:39 AM    Performed by: Tashia Alexander M.D.  Authorized by: Tashia Alexander M.D.    Start Time:  1/11/2024 11:39 AM  Reason for Block: at surgeon's request and post-op pain management ONLY    patient identified, IV checked, site marked, risks and benefits discussed, surgical consent, monitors and equipment checked, pre-op evaluation and timeout performed    Patient Position:  Supine  Prep: ChloraPrep    Monitoring:  Heart rate, continuous pulse ox and cardiac monitor  Block Region:  Lower Extremity  Lower Extremity - Block Type:  Selective FEMORAL nerve block at the Adductor Canal    Laterality:  Left  Procedures: ultrasound guided  Image captured, interpreted and electronically stored.  Local Infiltration:  Lidocaine  Strength:  1 %  Dose:  3 ml  Block Type:  Single-shot  Needle Length:  100mm  Needle Gauge:  21 G  Needle Localization:  Ultrasound guidance  Ultrasound picture in chart  Injection Assessment:  Negative aspiration for heme, no paresthesia on injection, incremental injection and local visualized surrounding nerve on ultrasound

## 2024-01-11 NOTE — ANESTHESIA PREPROCEDURE EVALUATION
Case: [id number] Date/Time: 01/11/24 1115    Procedure: ARTHROPLASTY, KNEE, TOTAL (Left: Knee)    Anesthesia type: General    Diagnosis:       Chronic pain of left knee [M25.562, G89.29]      Tricompartment osteoarthritis of left knee [M17.12]    Pre-op diagnosis: Chronic pain of left knee [M25.562, G89.29] Tricompartment osteoarthritis of left knee [M17.12]    Location:  OR  / SURGERY [facility]    Surgeons: Yosef Jensen M.D.            Relevant Problems   No relevant active problems       Physical Exam    Airway   Mallampati: I  TM distance: >3 FB  Neck ROM: full       Cardiovascular - normal exam     Dental   (+) upper dentures, lower dentures           Pulmonary   Breath sounds clear to auscultation     Abdominal    Neurological - normal exam                   Anesthesia Plan    ASA 2       Plan - general and peripheral nerve block     Peripheral nerve block will be post-op pain control  Airway plan will be ETT          Induction: intravenous    Postoperative Plan: Postoperative administration of opioids is intended.    Pertinent diagnostic labs and testing reviewed    Informed Consent:    Anesthetic plan and risks discussed with patient.

## 2024-01-11 NOTE — ANESTHESIA POSTPROCEDURE EVALUATION
Patient: Karthik Gaona    Procedure Summary       Date: 01/11/24 Room / Location:  OR 03 / SURGERY [facility]    Anesthesia Start: 1149 Anesthesia Stop: 1245    Procedure: ARTHROPLASTY, KNEE, TOTAL (Left: Knee) Diagnosis:       Chronic pain of left knee      Tricompartment osteoarthritis of left knee      (Chronic pain of left knee [M25.562, G89.29] Tricompartment osteoarthritis of left knee [M17.12])    Surgeons: Yosef Jensen M.D. Responsible Provider: Tashia Alexander M.D.    Anesthesia Type: general, peripheral nerve block ASA Status: 2            Final Anesthesia Type: general, peripheral nerve block  Last vitals  BP   Blood Pressure: (!) 143/69    Temp   36.3 °C (97.3 °F)    Pulse   86   Resp   16    SpO2   98 %      Anesthesia Post Evaluation    Patient location during evaluation: PACU  Patient participation: complete - patient participated  Level of consciousness: awake and alert  Pain score: 6    Airway patency: patent  Anesthetic complications: no  Cardiovascular status: adequate  Respiratory status: acceptable  Hydration status: acceptable    PONV: none          No notable events documented.     Nurse Pain Score: 8 (NPRS)

## 2024-01-11 NOTE — H&P
Surgery Orthopedic History & Physical Note    Date  1/11/2024    Primary Care Physician  MEHRAN Castro      Pre-Op Diagnosis Codes:     * Chronic pain of left knee [M25.562, G89.29]     * Tricompartment osteoarthritis of left knee [M17.12]    HPI  This is a 63 y.o. female who presented with left knee pain secondary to advanced djd.  She elects to undergo a left TKA.    Past Medical History:   Diagnosis Date    Arthritis     rheumatoid     Bowel habit changes     diarrhea and constipation    Cancer (HCC) 06/2015    colon    Dental disorder     upper and lower dentures    Diabetes (HCC)     oral medication, Insulin    Heart burn     Hepatic steatosis     per CT scan    High cholesterol     Hypertension     11/2020 pt states well controlled on medications    Obesity (BMI 30-39.9) 08/04/2020    Sleep apnea     no device    Unspecified disorder of thyroid     hypothyroid        Past Surgical History:   Procedure Laterality Date    HYSTERECTOMY ROBOTIC XI  05/28/2021    Procedure: HYSTERECTOMY, ROBOT-ASSISTED, USING DA JOE XI;  Surgeon: Kenna Rodriges M.D.;  Location: SURGERY [facility];  Service: Gyn Robotic    SALPINGO OOPHORECTOMY Bilateral 05/28/2021    Procedure: SALPINGO-OOPHORECTOMY;  Surgeon: Kenna Rodriges M.D.;  Location: [facility];  Service: Gyn Robotic    ME HYSTEROSCOPY,DX,SEP PROC N/A 08/04/2020    Procedure: HYSTEROSCOPY, DIAGNOSTIC;  Surgeon: Divina Jordan M.D.;  Location: SURGERY SAME DAY [facility];  Service: Obstetrics    DILATION AND CURETTAGE N/A 08/04/2020    Procedure: DILATION AND CURETTAGE;  Surgeon: Divina Jordan M.D.;  Location: SURGERY SAME DAY [facility];  Service: Obstetrics    CATH PLACEMENT  08/18/2015    Procedure: CATH PLACEMENT PORT ;  Surgeon: Curt Seaman M.D.;  Location: [facility];  Service:     LOW ANTERIOR RESECTION ROBOTIC XI  06/18/2015    Procedure: LOW ANTERIOR RESECTION ROBOTIC XI , low pelvic anastomosis ,  poss open ;  Surgeon: Curt Seaman M.D.;  Location: SURGERY [facility];  Service:     OTHER      port removal     UMBILICAL HERNIA REPAIR         No current facility-administered medications for this encounter.       Social History     Socioeconomic History    Marital status:      Spouse name: Not on file    Number of children: Not on file    Years of education: Not on file    Highest education level: Not on file   Occupational History    Not on file   Tobacco Use    Smoking status: Never    Smokeless tobacco: Never   Vaping Use    Vaping Use: Never used   Substance and Sexual Activity    Alcohol use: Yes     Comment: Occassionally    Drug use: No    Sexual activity: Not on file   Other Topics Concern    Not on file   Social History Narrative    Not on file     Social Determinants of Health     Financial Resource Strain: Not on file   Food Insecurity: Not on file   Transportation Needs: Not on file   Physical Activity: Not on file   Stress: Not on file   Social Connections: Not on file   Intimate Partner Violence: Not on file   Housing Stability: Not on file       No family history on file.    Allergies  Dulaglutide    Review of Systems  Negative    Physical Exam  Lungs:  CTA bilat  CV:  RRR  Left knee:  pain and crepitus with rnage of motion  Vital Signs                          Labs:                    Radiology:  No orders to display         Assessment/Plan:  Pre-Op Diagnosis Codes:     * Chronic pain of left knee [M25.562, G89.29]     * Tricompartment osteoarthritis of left knee [M17.12]  Procedure(s):  ARTHROPLASTY, KNEE, TOTAL

## 2024-01-11 NOTE — ANESTHESIA TIME REPORT
Anesthesia Start and Stop Event Times       Date Time Event    1/11/2024 1149 Anesthesia Start     1150 Ready for Procedure     1245 Anesthesia Stop          Responsible Staff  01/11/24      Name Role Begin End    Tashia Alexander M.D. Anesth 1149 1245          Overtime Reason:  no overtime (within assigned shift)    Comments:

## 2024-01-11 NOTE — OP REPORT
DATE OF SERVICE: 1/11/24    PREOPERATIVE DIAGNOSES:  1. left knee advanced tricompartmental arthritis.     POSTOPERATIVE DIAGNOSES:  1. left knee advanced tricompartmental arthritis.     PROCEDURE PERFORMED: left total knee arthroplasty    SURGEON: Yosef Jensen MD.     ASSISTANT: NORMAN Garcia    ANESTHESIA: General with Adductor canal femoral nerve block for postoperative pain control.     ANESTHESIOLOGIST: MD Benjamin    ANTIBIOTICS: Ancef and Vancomycin.     IMPLANTS: Erna Triathlon system, size 3 femur, 3 tibial baseplate, 9 poly, and 29A patellar button    COMPLICATIONS: None    BLOOD LOSS: 50    INDICATIONS: The patient presents with a chief complaint of knee pain recalcitrant to conservative treatment. The patient has advanced knee arthritis. The risks of the surgery were discussed at length. All questions were answered, and no guarantees given. The patient elected to proceed with the proposed procedure.     DESCRIPTION OF PROCEDURE: The patient was properly identified in the preoperative holding room and the left knee was marked as the correct surgical site. The patient was taken to the operative suite and placed in supine on the operative table. The patient underwent general anesthesia. After review of allergies, antibiotics were administered, a time out was called. The left knee and lower extremity was sterilely prepped and draped in standard fashion. The limb was exsanguinated and tourniquet was inflated to 250mmHG. A standard midline incision was made followed by medial patellar arthrotomy. The medial and lateral meniscus were removed as well as the ACL. The PCL was protected. There was significant tricompartmental arthritis. The knee was placed in 90 degrees of flexion. A drill hole was made on the distal femur. The distal cut was made.  Anterior referencing measured size 3. This was drilled in 3 degrees of external rotation and was followed by a 4-in-1 cutting block followed by  intramedullary tibial guide after checking appropriate rotations, slope and height. The tibia was drilled and pinned in place. The proximal tibia cut was performed. Appropriate soft tissue balance at 0 - 30 degrees. A size 3  tibial base had good coverage without overhang, was repaired and trialed, brought in extension with a 9 poly liner. The patella was measured with a caliper, a saw cut was made, drilled for a 29A button. The femoral punch and tibial punch was used. The trial implants were removed. The implants were press fit with excellent bone fixation, first the tibia, then the femur, brought in extension with # 11 poly and the the patella. This was soaked with betadine and saline and the tourniquet was deflated. Good hemostasis was obtained. Once again, retrialed and the final 9 CR poly was used, irrigated and then closed in flexion with with # 2 Quill, reinforced with # 1 Vicryl, 2-0 Vicryl and staples on skin. All counts were correct. NORMAN Garcia, was present throughout the operation and key essential part of the success of the operation assisting with patient positioning, instrumentation, retraction, and closure.

## 2024-01-11 NOTE — OR NURSING
1335 sleeping, vss    1345 rouses to verbal stim, vss, states pain better now 4/10, called family with update    1400 trial on room air, sat to 88, reapplied via n/c @1L    1415 sleeping, responds to verbal stim, vss, states pain ok, o2 sat 97%, o2 turned down to 0.5Ln/c    1430 sleepy, vss, maintains sat 94%, down to 80's on room air, back to 90's with encouraged deep breathing/coughing, drops when dozes off    1445 awake,assisted up to side of bed, tolerated well vss, maintaining o2 sat 95-97    1500 ambulated with walker with stand by assist, Meets criteria to transfer to Stage 2, called report to Aparna MCRAE, pt ambulate to phase 2 with cna

## 2024-01-11 NOTE — LETTER
November 17, 2023    Patient Name: Karthik Gaona  Surgeon Name: Yosef Jensen M.D.  Surgery Facility: [facility] ([address] [facility])  Surgery Date: 1/11/2024    The time of your surgery is not final and may change up to and until the day of your surgery. You will be contacted 24-48 hours prior to your surgery date with your check-in and surgery time.    If you will not be at one of the below numbers please call the surgery scheduler at [phone]  Preferred Phone: [phone]    BEFORE YOUR SURGERY   Pre Registration and/or Lab Work must be done within and no earlier than 28 days prior to your surgery date. Your scheduled facility will contact you regarding all required preregistration and/or lab work. If you have not been contacted within 7 days of your scheduled procedure please call [facility] at [phone] for an appointment as soon as possible.    Pre op Appointment:   Date: 01/03/24   Time: 9:45AM   Provider: Yosef Jensen MD   Location: [address] [place] [id number]  Instructions: Bring a list of all medications you are taking including the dosing and frequency.    DAY OF YOUR SURGERY  Nothing to eat or drink after midnight     Refrain from smoking any substance after midnight prior to surgery. Smoking may interfere with the anesthetic and frequently produces nausea during the recovery period.    Continue taking all lifesaving medications. Including the morning of your surgery with small sip of water.    Please do NOT take on the day of surgery:  Diuretics: examples- furosemide (Lasix), spironolactone, hydrochlorothiazide  ACE-inhibitors: examples- lisinopril, ramipril, enalapril  “ARBs”: examples- losartan, Olmesartan, valsartan    Please arrive at the hospital/surgery center at the check-in time provided.     An adult will need to bring you and take you home after your surgery.         AFTER YOUR SURGERY  Post op  Appointment:   Date: 01/26/24   Time: 9:45AM   With: Yosef Jensen MD   Location: [address] [place] [facility] [place] [address]    - Therapy- Your first appointment should be 3  day(s) after your surgery. For your convenience we have 4 Physical Therapy locations: [place], [facility], [place], and [facility]. Call our office ASAP to schedule an appointment at [phone] or take the enclosed Therapy Prescription to a facility of your choice.  - Post Surgery - You will need someone to drive you home  - Post Surgery - You will need someone to stay with you for 24 hours     TIME OFF WORK  FMLA or Disability forms can be faxed directly to: [phone] or you may drop them off at [address] [place] [facility] [place] [address]. Our office charges a $35.00 fee per form. Forms will be completed within 10 business days of drop off and payment received. For the status of your forms you may contact our disability office directly [phone].    MEDICATION INSTRUCTIONS **Please read section completely**    The following medications should be stopped a minimum of 10 days prior to surgery:  All over the counter, Supplements & Herbal medications    Anorectics: Phentermine (Adipex-P, Lomaira and Suprenza), Phentermine-topiramate (Qsymia), Bupropion-naltrexone (Contrave)    Opiod Partial Agonists/Opioid Antagonists: Buprenorphine (Subocone, Belbuca, Butrans, Probuphine Implant, Sublocade), Naltrexone (ReVia, Vivitrol), Naloxone    Amphetamines: Dextroamphetamine/Amphetamine (Adderall, Mydayis), Methylphenidate Hydrochloride (Concerta, Metadate, Methylin, Ritalin)    The following medications should be stopped 5 days prior to surgery:  Blood Thinners: Any Aspirin, Aspirin products, anti-inflammatories such as ibuprofen and any blood thinners such as Coumadin and Plavix. Please consult your prescribing physician if you are on life saving blood thinners, in regards to when to stop medications prior to surgery.     The following medications should  be stopped a minimum of 3 days prior to surgery:  PDE-5 inhibitors: Sildenafil (Viagra), Tadalafil (Cialis), Vardenafil (Levitra), Avanafil (Stendra)    MAO Inhibitors: Rasagiline (Azilect), Selegiline (Eldepryl, Emsam, Selapar), Isocarboxazid (Marplan), Phenelzine (Nardil)

## 2024-01-11 NOTE — DISCHARGE INSTRUCTIONS
If any questions arise, call your provider.  If your provider is not available, please feel free to call the [facility] at [phone].    MEDICATIONS: Resume taking daily medication.  Take prescribed pain medication with food.  If no medication is prescribed, you may take non-aspirin pain medication if needed.  PAIN MEDICATION CAN BE VERY CONSTIPATING.  Take a stool softener or laxative such as senokot, pericolace, or milk of magnesia if needed.    Last pain medication (5mg Oxycodone) given at 1:15 pm    What to Expect Post Anesthesia    Rest and take it easy for the first 24 hours.  A responsible adult is recommended to remain with you during that time.  It is normal to feel sleepy.  We encourage you to not do anything that requires balance, judgment or coordination.    FOR 24 HOURS DO NOT:  Drive, operate machinery or run household appliances.  Drink beer or alcoholic beverages.  Make important decisions or sign legal documents.    To avoid nausea, slowly advance diet as tolerated, avoiding spicy or greasy foods for the first day.  Add more substantial food to your diet according to your provider's instructions.  Babies can be fed formula or breast milk as soon as they are hungry.  INCREASE FLUIDS AND FIBER TO AVOID CONSTIPATION.    MILD FLU-LIKE SYMPTOMS ARE NORMAL.  YOU MAY EXPERIENCE GENERALIZED MUSCLE ACHES, THROAT IRRITATION, HEADACHE AND/OR SOME NAUSEA.      Dr. Jensen's Home Care Instructions:  -Ice.   -Elevate.  -Weight Bearing as tolerated  -START Physical Therapy within 5 days  -Work hard on range of motion daily!  -Ok to shower. Keep dressings in place.   -Place Angel Hose Bilateral lower extremities, except you may remove them for showering.    -Call for questions or concerns.     -F/U appt as scheduled.     Peripheral Nerve Block Discharge Instructions from Same Day Surgery and Inpatient :    What to Expect - Lower Extremity  The block may cause you to experience numbness and weakness in your  "thigh and knee or calf and foot on the same side as your surgery  Numbness, tingling and / or weakness are all normal. For some people, this may be an unpleasant sensation  These issues will be resolved when the local anesthetic wears off   You may experience numbness and tingling in your thigh on the same side as your surgery if the block medicine was injected at your groin area  Numbness will make it difficult to walk  You may have problems with balance and walking so be very careful   Follow your surgeon's direction regarding weight bearing on your surgical limb  Be very careful with your numb limb  Precautions  The numbness may affect your balance  Be careful when walking or moving around  Your leg may be weak: be very careful putting weight on it  If your surgeon did not specify a time, you should not bear weight for 24 hours  Be sure to ask for help when you need it  It is better to have help than to fall and hurt yourself  Prevent Injury  Protect the limb like a baby  Beware of exposing your limb to extreme heat or cold or trauma  The limb may be injured without you noticing because it is numb  Keep the limb elevated whenever possible  Do not sleep on the limb  Change the position of the limb regularly  Avoid putting pressure on your surgical limb  Pain Control  The initial block on the day of surgery will make your extremity feel \"numb\"  Any consecutive injection including prior to discharge from the hospital will make your extremity feel \"numb\"  You may feel an aching or burning when the local anesthesia starts to wear off  Take pain pills as prescribed by your surgeon  Call your surgeon or anesthesiologist if you do not have adequate pain control  Instrucciones Para [place]  (Home Care Instructions)    ACTIVIDAD: Descanse y tome todo con mucha calma las primeras 24 horas después de bryant cirugía.  Hailey persona adulta responsable debe permanecer con usted alta dano periodo de tiempo.  Es normal sentirse " sonoliento o sonolienta alta esas primeras horas.  Le recomendamos que no iam nada que requiera equilibrio, lacy decisiones a mucha coordinación de bryant parte.    NO IAM ESTO PURANTE LAS PRIMERAS 24 HORAS:   Manejar o conducir algún vehiculo, operar maquinarias o utilizar electrodomesticos.   Beber cerveza o algún otro tipo de bebida alcohólica.   Lacy decisiones importantes o firmar documentos legales.    INSTRUCCIONES ESPECIALES:    GUÍA DE CUIDADOS POSTERIORES AL REEMPLAZO TOTAL DE RODILLA   TOTAL KNEE REPLACEMENT, AFTER-CARE GUIDELINES     Estas instrucciones le brindan información acerca de cómo debe cuidarse y cuidar bryant rodilla después de la cirugía. También es posible que bryant proveedor de atención médica le dé instrucciones más específicas. Bryant tratamiento se planificó y se llevó a cabo de acuerdo con las prácticas médicas actuales; sin embargo, a veces se presentan problemas. Si tiene algún problema o alguna pregunta, llame a bryant proveedor de atención médica.    QUÉ DEBE ESPERAR DESPUÉS DEL PROCEDIMIENTO   Después del procedimiento, por lo general, la rodilla se volverá rígida, le generará dolor y presentará hematomas. Estos síntomas mejorarán con el tiempo.     Dolor   Siga bryant plan para el manejo del dolor en el hogar según lo acordado con el personal de enfermería y según lo indicado por bryant proveedor.   Es importante que cumpla con la administración programada de analgésicos para aliviar la mayor cantidad de dolor.   Si le recetan medicamentos opioides, el objetivo es utilizarlos solo cuando sea necesario y dejar de lacy analgésicos con receta lo antes posible.   Utilice hielo para controlar el dolor.   Coloque hielo en ege bolsa de plástico.   Coloque gee toalla entre la piel y la bolsa.   Deje el hielo sobre la rodilla alta 20 minutos, de 2 a 3 veces por día nasima mínimo.   La mayoría de los pacientes shashi de lacy analgésicos después de 3 semanas. Si el dolor que siente continúa siendo intenso,  realice un seguimiento con bryant proveedor.     Infección   Las infecciones en las articulaciones de la rodilla ocurren en menos del 2 % de los pacientes. Las causas más frecuentes de las infecciones después de gee cirugía de reemplazo total de rodilla provienen de bacterias que ingresan en el torrente sanguíneo alta procedimientos dentales, infecciones de las vías urinarias o infecciones cutáneas. Estas bacterias pueden alojarse cerca del reemplazo de rodilla y pueden provocar gee infección.   Mantenga la incisión lo más limpia y seca posible.   Lávese siempre las alan antes de tocar la incisión.   Evite recibir atención dental alta los 3 meses posteriores a la cirugía. Es posible que bryant proveedor le recomiende patrice gee dosis de antibióticos gee hora antes de un procedimiento dental. Luego de 2 años, la mayoría de los proveedores recomiendan patrice antibióticos solo antes de un procedimiento de larga duración. Pregúntele a bryant proveedor qué recomienda.   Los signos y síntomas de las infecciones incluyen fiebre baja, enrojecimiento, hinchazón y secreción de la incisión. Informe DE INMEDIATO a bryant proveedor si presenta CUALQUIERA de estos síntomas.     Alteraciones posoperatorias   Hábitos intestinales: el estreñimiento es muy frecuente y surge de gee combinación de anestesia, falta de movilidad, deshidratación y uso de analgésicos. De ser necesario, utilice ablandadores de heces o laxantes. Es importante no ignorar qian problema, ya que las obstrucciones intestinales pueden ser complicaciones graves tras gee cirugía de reemplazo de articulaciones.   Nivel de energía/estado de ánimo: muchos pacientes sufren la falta de energía y de resistencia alta 2 a 3 meses después de la cirugía. Algunas personas se sienten decaídas e incluso pueden deprimirse. [place] probablemente se deba a la anemia posoperatoria, los cambios en el nivel de actividad, la falta de sueño, el uso de analgésicos y simplemente la reacción  emocional a la cirugía, que representa gee gran alteración en la rafia de la persona. Por lo general, estos síntomas no son permanentes. Si los síntomas no desaparecen, realice un seguimiento con bryant proveedor de atención primaria.  Regreso al trabajo: bryant proveedor le dará instrucciones específicas en función de bryant profesión. Por lo general, si el trabajo es sedentario y requiere estar poco tiempo de pie o caminando, la mayoría de los pacientes regresan a trabajar entre las 2 y las 6 semanas después de la cirugía. Volver a realizar trabajos manuales que impliquen caminar, levantar objetos o estar de pie puede llevar de 3 a 4 meses. El consultorio de bryant proveedor puede proporcionarle un permiso para realizar trabajos ligeros o de tiempo parcial en gee etapa temprana de la recuperación e ir progresando hasta que pueda realizar bryant trabajo normalmente, en la medida de aretha posibilidades.   Conducción de vehículos: usted podrá comenzar a conducir cuando bryant proveedor lo autorice, siempre que haya dejado de patrice analgésicos narcóticos o cualquier otro medicamento que afecte bryant capacidad para conducir. Hable con bryant médico sobre el tiempo que deberá esperar, ya que volver a conducir dependerá de bryant vehículo, de qué rodilla le rosales reemplazado (la derecha o la izquierda) y de si debe patrice precauciones con respecto a los movimientos posoperatorios o no.   Evitar las caídas: gee caída alta las primeras semanas después de la cirugía puede dañar la nueva rodilla y podría significar que se necesite realizar otra cirugía. Retire los tapetes y fije las alfombras que estén sueltas. Tenga en cuenta los riesgos que se presentan en el suelo, nasima las mascotas, los objetos pequeños o las superficies irregulares. Si sufre alguna caída, infórmeselo a bryant proveedor.   Detectores de metales en aeropuertos: la sensibilidad de los detectores de metales puede variar y es posible que bryant prótesis dispare gee alarma. Infórmele al oficial de  seguridad sobre bryant articulación artificial.     Dieta   Reanude bryant dieta habitual en la medida en que pueda tolerarla.   Es importante lograr un estado nutricional saludable; para ello, debe llevar gee dieta mai equilibrada con regularidad.   Bryant proveedor puede recomendarle el consumo de suplementos vitamínicos y de zee.   Continúe bebiendo abundante cantidad de líquido.     Ducha/baño   Podrá ducharse en cuanto llegue a bryant hogar del hospital, a menos que le indiquen lo contrario.   Mantenga la incisión fuera del agua para prevenir gee infección. Para mantener la incisión seca al ducharse, cúbrala con gee bolsa o gee envoltura de plástico. Si la venda es impermeable, es posible que no necesite hacerlo.   Si la incisión se humedece, séquela con golpes suaves, no la frote. Informe a bryant proveedor.   No se sumerja en gee bañera hasta que bryant proveedor lo autorice. Para poder hacerlo, las grapas tienen que haberse retirado y la incisión tiene que haberse curado por completo.     Cambio de apósitos: deberá cambiarse los apósitos únicamente cuando bryant proveedor se lo indique.   Lávese las alan.   Leonardo todos los materiales necesarios para el cambio de apósitos.   Retire el apósito khloe y deséchelo.   Examine la incisión por signos de irritación o infección, nasima enrojecimiento, aumento de secreción transparente, secreción amarilla o fede, olor y piel circundante caliente al tacto. Si presenta algunos de estos signos, informe a bryant proveedor.   [place] el apósito nuevo desde kaylene de aretha extremos y colóquelo sobre la incisión. Procure no tocar el interior del apósito que colocará sobre la incisión.   Fíjelo nasima se indica.     Hinchazón/hematomas   La hinchazón es normal después de un reemplazo de rodilla y puede presentarse en el muslo, la rodilla, la pantorrilla y el pie.   La hinchazón puede durar entre 3 y 6 meses.   Para reducir la hinchazón, cuando se recueste, levante la pierna a un nivel más alto que el del corazón.  La primera semana que se encuentre en bryant hogar, debe levantar la pierna alta la misma cantidad de tiempo que se encuentra en actividad.   Por lo general, la hinchazón empeora después de regresar a bryant hogar, ya que usted está en posición vertical alta más tiempo.   Generalmente, los hematomas no aparecen hasta que usted llega a bryant hogar, y pueden ser bastante impresionantes por bryant color púrpura, aniceto o fede. Normalmente, los hematomas no generan preocupación y desaparecen sin realizar ningún tipo de tratamiento.     Prevención de la formación de coágulos de karen   Bryant plan de tratamiento incluye varias medidas preventivas para disminuir el riesgo de que se formen coágulos de karen en las piernas (trombosis venosa profunda [deep vein thrombosis, DVT]) y coágulos que se dirijan hacia los pulmones (émbolos pulmonares), que son menos frecuentes, trish graves. Si mai la mayoría de los pacientes tienen riesgo común de formar coágulos, entre las personas con mayor riesgo se encuentran las que rosales tenido coágulos previamente o que tienen antecedentes familiares de coagulación, las fumadoras, las diabéticas, las que sufren de obesidad, las personas en edad avanzada, las que utilizan estrógenos o las que llevan un estilo de rafia sedentario.     Algunos signos de coágulos de kraen en las piernas incluyen los siguientes: hinchazón del muslo, la pantorrilla o el tobillo que no mejora al levantar las piernas; dolor, calor y sensibilidad en la pantorrilla, la espalda, la parte posterior de la pantorrilla o el área inguinal. NOTA: Los coágulos de karen pueden presentarse en cualquiera de las piernas.   Algunos signos de coágulos de karen en los pulmones incluyen los siguientes: dificultad para respirar repentina y que empeora, inicio repentino de dolor de pecho y dolor de pecho localizado al toser.   Si tiene cualquiera de los síntomas anteriores, informe a bryant proveedor y busque atención médica de inmediato.   Si  recibió terapia anticoagulante (diluyentes de la karen) en el hospital, continúe tomando los medicamentos anticoagulantes recetados en bryant hogar, según lo indicado por bryant proveedor.   El riesgo de que se formen coágulos permanece entre 2 y 3 meses después de la cirugía. Evite la deshidratación y el permanecer sentado alta mucho tiempo (viajes en avión o en auto de larga duración). Si se va de viaje alta qian tiempo, levántese, muévase cada 1 hora o 1 hora y media y analice todos los planes de viaje con bryant proveedor.     Actividad   Cuando regrese a bryant casa, manténgase activo. La clave está en no excederse. Si mai puede esperar tener algunos días buenos y otros días malos, debería notar gee mejora gradual y un aumento gradual en bryant resistencia en los próximos 6 a 12 meses. El ejercicio es un componente fundamental de la recuperación, especialmente alta las primeras semanas después de la cirugía.     Actividades cotidianas: podrá reanudar la mayor parte de estas actividades en un plazo de 3 a 6 semanas después de la cirugía. Es común sentir un poco de dolor al realizar actividades y en la noche alta algunas semanas después de la cirugía. Cuando el médico lo apruebe, camine todo lo que desee, trish recuerde que caminar no sustituye los ejercicios que bryant médico y bryant fisioterapeuta le indicaron. Utilice un andador, muletas o un bastón para ayudarle a caminar hasta que pueda hacerlo con soltura (con gee pequeña cojera o sin cojera) y sin asistencia.   Ejercicios de la terapia física: siga bryant programa de ejercicios en el hogar según lo indicado por bryant fisioterapeuta alta bryant estadía en el hospital. Llame para programar citas de terapia física para pacientes ambulatorios, según le recomiende bryant proveedor. La terapia física tras la estadía en el hospital se centra en aumentar bryant rango de movilidad, fortalecer los músculos y mejorar bryant forma de andar o de caminar. Si aún no ha recibido el referido para recibir  terapia física para pacientes ambulatorios, comuníquese con bryant proveedor para obtenerlo.   Utilizar gee bicicleta fija puede ayudar a conservar el pura muscular y a mantener la flexibilidad de la rodilla. Comience a utilizar gee bicicleta fija según lo indicado por bryant fisioterapeuta o bryant proveedor.  Actividad sexual: bryant proveedor podrá decirle cuándo será seguro retomar la actividad sexual.   Posiciones para dormir: puede dormir boca arriba, de costado o boca abajo sin inconvenientes.   Otras actividades: se recomienda realizar actividades de bajo impacto. Si tiene preguntas específicas, consulte a bryant proveedor.     Cuándo llamar al médico   Llame a bryant proveedor si presenta los siguientes síntomas:   Fiebre superior a 100.5 ºF.   Aumento del dolor, secreción, enrojecimiento, olor o calor alrededor del lugar de la incisión.   Escalofríos.   Aumento del dolor de rodilla al realizar actividades y al hacer reposo.   Aumento del dolor en la pantorrilla, sensibilidad o enrojecimiento por encima o por debajo de la rodilla.   Aumento de la hinchazón en la pantorrilla, el tobillo o el pie.   Dificultad para respirar repentina y que empeora, inicio repentino de dolor de pecho y dolor de pecho localizado al toser.   Abertura de la incisión.   O mai, llámelo si tiene alguna pregunta o inquietud sobre los medicamentos o la atención.     Recursos estadísticos sobre infecciones:   [email or web address]     DIETA: Para evitar las nauseas, prosiga despacito con bryant dieta a medida que pueda ir tolerándola mejor, evite comidas muy condimentadas o grasosas alta dano primer día.  Vaya agregando comidas más substanciadas a bryant dieta a medida que asi lo indique bryant médica.  Los bebés pueden beber leche preparada o formula, ásl nasima también leche del seno de la madre a medida que vayan teniendo hambre.  SIGA AGREGANDO LIQUIDOS Y COMIDAS  CON FIBRA PARA EVITAR ESTREÑIMIENTO.    LAURYN BAÑARSE Y CAMBIAR LOS VENDAJES DE LA CIRUGIA:     MEDICAMENTOS/MEDICINAS:  Vuelva a patrice aretha medicamentos diarios.  [place] los medicamentos que se le prescribe con un poco de comida.  Si no le prescribe ningún tipo de medicamento, entonces puede patrice medicinas para el dolor que no contienen aspirina, si las necesita.  LAS MEDICINAS PARA EL DOLOR PUEDEN ESTREÑIRLE MUCHO.  [place] un suavizante para el excremento o materia fecal (stool softener) o un laxativo lauryn por ejemplo: senokot, pericolase, o leche de magnesia, si lo necesita.    La prescripción la administro pre-op.  La ultima sosis de medicina para el dolor fue administrada  5mg Oxycodone) given at 1:15 pm    Se debe hacer gee consulta medica con el doctor en 7-10 carrasco, Líame para hacer la bimal.    Usted debe LIAMAR A SARAVIA MEDICO si tiene los siguientes síntomas:   -   Gee fiebre más kalyn de 101 grados Fahrenheit.   -   Un dolor incesante aún con los medicamentos, o nauseas y vómito persistente.   -   Un sangrado excesivo (karen que traspasa los vendajes o gasas) o algúln tipo de drenaje inesperado que proviene de la henda.     -   Un color rodriges exagerado o hinchazón alrededor del área en donde se le hizo incisión o neal, o un drenaje de pus o con olor lisandra proveniente de la henda.   -    La inhabilidad de orinar o vaciar saravia vejiga en 8 horas.   -    Problemas con a respiración o cinthya en el pecho.    Usted debe llamar al 911 si se presentan problemas con el dolor al respirar o el pecho.  Si no se puede ponnoer en comunicación con un medica o con el centro de cirugía, usted debe ir a la estación de emergencia (emergency room) más cercana o a un centro de atención de urgencia (urgent care center).  El teléfono del medico es: [phone]    LOS SÍNTOMAS DE UN LEVE RESFRIO SON MUY NORMALES.  ADEMÁS USTED PUEDE LLEGAR A SENTIR CINTHYA GENERALES DE MÚSCULOS, IRRITACIÓN EN LA GARGANTA, CINTHYA DE KLEVER Y/O UN POCO DE  NAUSEAS.    Sie tiene alguna pregunta, llame a bryant médico.  Si bryant médico no se encuentra disponible, por favor llame al Centro de Cirugía at [phone].  el Centro está abierto de Lunes a Viernes desde las 7:00 de la manana hasta las 5:00 de la noche.      Mi firma a continuación indica que he recibido y entiendco estas instrucciones acera de los cuidados en la casa (Home Care Instructions)    Usted recibirá gee encuesta en la correspondencia en las siguientes semanas y le pedimos que por favor tome un momento para completar tripp encuesta y regresaría a hosotros.  Nuestro objetivó es brindarle un cuidado muy macias y par lo tanto apreciamos aretha coméntanos.  Muchas yessi por darius escogido el [facility].    Instrucciones especiales del Dr. Jensen   Actividad: La primera semana después de la artroplastia de la rodilla es un momento para recuperarse de la cirugía. Esperamos que el ejercicio ligero te mantenga activo y móvil. Soporta peso según lo tolerado.    Dispositivos asistidos: Le están dando el kalyn con un andador o muletas.    Prevención de la trombosis venosa profunda (TVP): Lo enviarán a casa con medias y/o medicamentos.    Apósito y cuidado de heridas: Mantenga el apósito limpio y seco. Deje el apósito en bryant lugar hasta el seguimiento.    Ducha / Baño: Está mai ducharse, mantenga el vestido en bryant lugar. Seque el vendaje con palmaditas, no frote la incisión.  Las piscinas, los jacuzzis o los jarrett deben evitarse hasta después de bryant seguimiento y solo si bryant cirujano lo autoriza.      Aplique hielo: Aplique compresas de hielo en la rodilla (15 minutos en la rodilla, 15 minutos fuera de la rodilla) alta la primera semana, según lo considere necesario para aliviar el dolor y la hinchazón.    Hinchazón/moretones: La hinchazón es gee respuesta esperada a la cirugía. Para reducir la hinchazón, eleve la extremidad quirúrgica por encima del nivel del corazón varias  veces al día y aplique hielo (consulte las instrucciones de hielo). Si bryant hinchazón se vuelve excesiva, limita bryant capacidad para moverse o se vuelve preocupante, comuníquese con el consultorio de bryant médico.    Los moretones a menudo no aparecen hasta después de llegar a casa y pueden ser de color púrpura, aniceto o fede de apariencia bastante dramática. Por lo general, los hematomas no son preocupantes y desaparecerán sin ningún tratamiento.    Seguimiento: Asegúrese de tener gee bimal de 7 a 14 días después de la cirugía. Se discutirá bryant procedimiento/rehabilitación y es posible que se le recete fisioterapia en dano momento.

## 2024-01-11 NOTE — OR NURSING
0950: Brought patient back to pre-op and assumed care. Daughter-in-law at bedside to be her .  1115: Patient allergies and NPO status verified, home medication reconciliation completed and belongings secured. Patient verbalizes understanding of pain scale, expected course of stay and plan of care. Surgical site verified with patient. IV access established. Sequentials placed on legs.

## 2024-01-11 NOTE — OR NURSING
1243: To PACU post left total knee arthroplasty w/ block. Pt  is extubated, breathing is spontaneous and unlabored. Dressings are c/d/i. Ice applied to knee.    1255: Pt is more awake. Currently denies pain or nausea.    1314: Medicated for pain of 6/10.    1332: Pain increased to 8/10, see MAR.    1335: Report given to RHINA Wade RN.

## 2024-01-12 NOTE — THERAPY
Physical Therapy   Initial Evaluation     Patient Name: Karthik Gaona  Age:  63 y.o., Sex:  female  Medical Record #: [id number]  Today's Date: 1/11/2024     Precautions  Precautions: (P) Weight Bearing As Tolerated Left Lower Extremity    Assessment  Patient is 63 y.o. female who was seen s/p left TKA with WBAT orders for left lower extremity. Pt was agreeable to therapy evaluation and presented with safe upright functional mobility with AD use after therapy session. Pt was provided with education on elevation, icing, positioning, and supine/seated therapeutic exercises. Pt was able to return demo safe gait mechanics with use of AD on flat level surfaces with appropriate step to mechanics. Pt was able to return demo safe mechanics in order to navigate stairs with use of FWW. Pt was able to demonstrate safe use of AD during transfers/transitions and general locomotion with no tad LOB. Pt has no acute skilled PT needs at this time, anticipate pt to d/c home once medically clear with recs for FWW use and OP therapy services.     The pt was provided with the following skilled therapy txt: Pt was provided with VC, demo, and facilitation during gait training in order to return demo terminal knee extension and appropriate heel strike/flexion of knee in order to return demo appropriate heel to toe gait mechanics, however, pt demonstrated with step to gait with knee in extension most of the time due to pain. Pt was provided with compensatory strategies to use B UE on FWW in order to offload L LE and dec pain with ambulation. Pt was provided with demo and VC to go up/down steps with safe mechanics with use of AD. Pt was provided with VC and TC for appropriate quad contraction and mechanics during supine/seated therapeutic exercises. Pt was provided with education on intensity, frequency, and duration of exercises. Pt provided with education on car transfer and provided with plastic bag to put on seat for easier  transition to bring B LE into care. Pt was provided with education on appropriate sizing of personal FWW and was adjusted to her height.     Plan    DC Equipment Recommendations: (P) Front-Wheel Walker ([employer])  Discharge Recommendations: (P) Recommend outpatient physical therapy services to address higher level deficits     Objective       01/11/24 1634   Initial Contact Note    Initial Contact Note Order Received and Verified, Evaluation Only - Patient Does Not Require Further Acute Physical Therapy at this Time.  However, May Benefit from Post Acute Therapy for Higher Level Functional Deficits.   Precautions   Precautions Weight Bearing As Tolerated Left Lower Extremity   Pain 0 - 10 Group   Location Knee   Location Orientation Left   Description Aching   Therapist Pain Assessment Post Activity;Prior to Activity;During Activity;Nurse Notified  (c/o moderate pain, not formally rated)   Prior Living Situation   Prior Services None   Housing / Facility 1 Story House   Steps Into Home 2   Steps In Home 0   Equipment Owned None   Lives with - Patient's Self Care Capacity Spouse   Comments spouse is supportive and can assist upon d/c to home   Prior Level of Functional Mobility   Bed Mobility Independent   Transfer Status Independent   Ambulation Independent   Ambulation Distance   (community)   Assistive Devices Used None   Stairs Independent   History of Falls   History of Falls No   Cognition    Cognition / Consciousness WDL   Level of Consciousness Alert   Comments pleasant/cooperative   Passive ROM Upper Body   Passive ROM Upper Body WDL   Active ROM Upper Body   Active ROM Upper Body  WDL   Strength Upper Body   Upper Body Strength  WDL   Sensation Upper Body   Upper Extremity Sensation  WDL   Upper Body Muscle Tone   Upper Body Muscle Tone  WDL   Passive ROM Lower Body   Passive ROM Lower Body X   Comments limited due to pain, able to demo 0-70 deg of L knee ROM   Active ROM Lower Body    Active ROM Lower Body   X   Comments limited by pain, able to demo 0-50 deg of L knee ROM   Strength Lower Body   Lower Body Strength  X   Comments limited by pain, able to demo functional strength for B LE   Sensation Lower Body   Lower Extremity Sensation   WDL   Lower Body Muscle Tone   Lower Body Muscle Tone  WDL   Neurological Concerns   Neurological Concerns No   Coordination Upper Body   Coordination WDL   Coordination Lower Body    Coordination Lower Body  WDL   Balance Assessment   Sitting Balance (Static) Good   Sitting Balance (Dynamic) Fair +   Standing Balance (Static) Fair +   Standing Balance (Dynamic) Fair +   Weight Shift Sitting Good   Weight Shift Standing Fair   Comments w/fww use   Bed Mobility    Comments found [facility]   Gait Analysis   Gait Level Of Assist Standby Assist   Assistive Device Front Wheel Walker   Distance (Feet) 120   # of Times Distance was Traveled 1   Deviation Antalgic;Step To   # of Stairs Climbed 2   Level of Assist with Stairs Standby Assist   Weight Bearing Status WBAT L LE   Functional Mobility   Sit to Stand Standby Assist   Bed, Chair, Wheelchair Transfer Standby Assist   Transfer Method Stand Step   Mobility w/fww use   How much difficulty does the patient currently have...   Turning over in bed (including adjusting bedclothes, sheets and blankets)? 4   Sitting down on and standing up from a chair with arms (e.g., wheelchair, bedside commode, etc.) 4   Moving from lying on back to sitting on the side of the bed? 4   How much help from another person does the patient currently need...   Moving to and from a bed to a chair (including a wheelchair)? 4   Need to walk in a hospital room? 3   Climbing 3-5 steps with a railing? 3   6 clicks Mobility Score 22   Activity Tolerance   Sitting in Chair functional   Sitting Edge of Bed NT   Standing 10 mins   Comments no adverse events noted, limited by pain   Edema / Skin Assessment   Edema / Skin  Not Assessed   Education Group   Education Provided Role of  Physical Therapist;Gait Training;Stair Training;Use of Assistive Device;Exercises - Supine;Exercises - Seated   Role of Physical Therapist Patient Response Patient;Family;Acceptance;Demonstration;Explanation;Verbal Demonstration;Action Demonstration   Gait Training Patient Response Patient;Family;Acceptance;Demonstration;Explanation;Action Demonstration;Verbal Demonstration   Stair Training Patient Response Patient;Family;Acceptance;Explanation;Demonstration;Action Demonstration;Verbal Demonstration   Use of Assistive Device Patient Response Patient;Acceptance;Explanation;Demonstration;Verbal Demonstration;Action Demonstration   Exercises - Supine Patient Response Patient;Acceptance;Explanation;Demonstration;Handout;Verbal Demonstration;Action Demonstration   Exercises - Seated Patient Response Patient;Acceptance;Explanation;Demonstration;Handout;Verbal Demonstration;Action Demonstration   Anticipated Discharge Equipment and Recommendations   DC Equipment Recommendations Front-Wheel Walker  ([employer])   Discharge Recommendations Recommend outpatient physical therapy services to address higher level deficits   Interdisciplinary Plan of Care Collaboration   IDT Collaboration with  Nursing   Patient Position at End of Therapy Seated;Call Light within Reach;Tray Table within Reach;Phone within Reach;Family / Friend in Room   Collaboration Comments aware of visit and recs   Session Information   Date / Session Number  1/11 eval only   Priority 0
